# Patient Record
Sex: MALE | Race: WHITE
[De-identification: names, ages, dates, MRNs, and addresses within clinical notes are randomized per-mention and may not be internally consistent; named-entity substitution may affect disease eponyms.]

---

## 2018-08-19 ENCOUNTER — HOSPITAL ENCOUNTER (EMERGENCY)
Dept: HOSPITAL 72 - EMR | Age: 53
Discharge: HOME | End: 2018-08-19
Payer: MEDICAID

## 2018-08-19 VITALS — HEIGHT: 69 IN | WEIGHT: 220 LBS | BODY MASS INDEX: 32.58 KG/M2

## 2018-08-19 VITALS — SYSTOLIC BLOOD PRESSURE: 139 MMHG | DIASTOLIC BLOOD PRESSURE: 93 MMHG

## 2018-08-19 VITALS — DIASTOLIC BLOOD PRESSURE: 93 MMHG | SYSTOLIC BLOOD PRESSURE: 139 MMHG

## 2018-08-19 DIAGNOSIS — G61.0: ICD-10-CM

## 2018-08-19 DIAGNOSIS — F19.10: Primary | ICD-10-CM

## 2018-08-19 DIAGNOSIS — F20.9: ICD-10-CM

## 2018-08-19 DIAGNOSIS — Z88.8: ICD-10-CM

## 2018-08-19 LAB
ADD MANUAL DIFF: NO
ALBUMIN SERPL-MCNC: 4.1 G/DL (ref 3.4–5)
ALBUMIN/GLOB SERPL: 1.1 {RATIO} (ref 1–2.7)
ALP SERPL-CCNC: 82 U/L (ref 46–116)
ALT SERPL-CCNC: 59 U/L (ref 12–78)
ANION GAP SERPL CALC-SCNC: 15 MMOL/L (ref 5–15)
AST SERPL-CCNC: 59 U/L (ref 15–37)
BASOPHILS NFR BLD AUTO: 1.4 % (ref 0–2)
BILIRUB SERPL-MCNC: 0.8 MG/DL (ref 0.2–1)
BUN SERPL-MCNC: 30 MG/DL (ref 7–18)
CALCIUM SERPL-MCNC: 9.1 MG/DL (ref 8.5–10.1)
CHLORIDE SERPL-SCNC: 97 MMOL/L (ref 98–107)
CO2 SERPL-SCNC: 25 MMOL/L (ref 21–32)
CREAT SERPL-MCNC: 1.4 MG/DL (ref 0.55–1.3)
EOSINOPHIL NFR BLD AUTO: 0.4 % (ref 0–3)
ERYTHROCYTE [DISTWIDTH] IN BLOOD BY AUTOMATED COUNT: 11.4 % (ref 11.6–14.8)
GLOBULIN SER-MCNC: 3.8 G/DL
HCT VFR BLD CALC: 41.6 % (ref 42–52)
HGB BLD-MCNC: 14.4 G/DL (ref 14.2–18)
LYMPHOCYTES NFR BLD AUTO: 13.6 % (ref 20–45)
MCV RBC AUTO: 89 FL (ref 80–99)
MONOCYTES NFR BLD AUTO: 7.6 % (ref 1–10)
NEUTROPHILS NFR BLD AUTO: 77 % (ref 45–75)
PLATELET # BLD: 231 K/UL (ref 150–450)
POTASSIUM SERPL-SCNC: 3.2 MMOL/L (ref 3.5–5.1)
RBC # BLD AUTO: 4.68 M/UL (ref 4.7–6.1)
SODIUM SERPL-SCNC: 137 MMOL/L (ref 136–145)
WBC # BLD AUTO: 11.8 K/UL (ref 4.8–10.8)

## 2018-08-19 PROCEDURE — 80053 COMPREHEN METABOLIC PANEL: CPT

## 2018-08-19 PROCEDURE — 80329 ANALGESICS NON-OPIOID 1 OR 2: CPT

## 2018-08-19 PROCEDURE — 99284 EMERGENCY DEPT VISIT MOD MDM: CPT

## 2018-08-19 PROCEDURE — 85025 COMPLETE CBC W/AUTO DIFF WBC: CPT

## 2018-08-19 PROCEDURE — 36415 COLL VENOUS BLD VENIPUNCTURE: CPT

## 2018-08-19 PROCEDURE — 80307 DRUG TEST PRSMV CHEM ANLYZR: CPT

## 2018-08-19 NOTE — EMERGENCY ROOM REPORT
History of Present Illness


General


Chief Complaint:  Behavioral Complaint


Source:  Patient


 (Josue Ge MD)


Source:  Patient


 (MITZI MEYERS M.D.)


Present Illness


HPI


Patient is a 52-year-old male presented after increased hallucinations and 

suicidal thoughts.  Patient states that he had prior history of had been 

staying at a residential facility up until about 2 weeks ago.  Patient states 

that he had been having increased auditory hallucinations telling him to harm 

himself.  He reports recent methamphetamine use.  He states this is 

approximately 3 days ago.  He denies any shortness of breath.He denies any 

chest pain.  He reports having been off of his psychiatric medications for 

several days.


 (Josue Ge MD)


Allergies:  


Coded Allergies:  


     CEPHALEXIN (Verified  Allergy, Severe, Anaphylaxis, 12/14/15)


     PROCHLORPERAZINE (Verified  Allergy, Unknown, 11/25/15)


     RISPERIDONE (Verified  Allergy, Unknown, 11/25/15)





Patient History


Reviewed Nursing Documentation:  PMH: Agreed; PSxH: Agreed (Josue Ge MD)





Nursing Documentation-PMH


Hx Cardiac Problems:  No - ARTHRITIS, BACK SX, GUILLAIN-BARRE, SKIN GRAFT, Hep-C


Hx Cancer:  No


Hx Gastrointestinal Problems:  Yes


Hx Neurological Problems:  Yes


Hx Guillian-[place] Syndrome:  Yes


 (Josue Ge MD)





Review of Systems


All Other Systems:  negative except mentioned in HPI


 (Josue Ge MD)





Physical Exam





Vital Signs








  Date Time  Temp Pulse Resp B/P (MAP) Pulse Ox O2 Delivery O2 Flow Rate FiO2


 


8/19/18 20:04 98.1 117 18 139/93 95 Room Air  





 98.1       








Sp02 EP Interpretation:  reviewed, normal


General Appearance:  alert/responsive, no apparent distress, GCS 15, non-toxic


Head:  atraumatic


Eyes:  PERRL, lids + conjunctiva normal


ENT:  normal ENT inspection, TMs + canals normal, hearing intact, no angioedema


Neck:  supple/symm/no masses, no meningismus


Respiratory:  effort normal, no wheezing, chest symmetrical


Cardiovascular:  regular rate, rhythm, no edema


Cardiovascular #2:  2+ carotid (R), 2+ carotid (L), 2+ dorsalis pedis (R), 2+ 

dorsalis pedis (L)


Gastrointestinal:  non-tender, no mass, non-distended, no rebound/guarding, 

normal bowel sounds


Musculoskeletal:  gait & station normal, strength & tone normal, normal ROM, non

-tender


Neurologic:  normal inspection, CN II-XII intact, oriented x3, sensory intact, 

normal speech


Skin:  no rash, well hydrated


Lymphatic:  normal inspection


 (Josue Ge MD)





Medical Decision Making


Diagnostic Impression:  


 Primary Impression:  


 Polysubstance abuse


 Additional Impression:  


 Schizophrenia


 Qualified Codes:  F20.9 - Schizophrenia, unspecified


ER Course


 Patient presented for auditory hallucinations.  Differential diagnoses include 

substance abuse, psychosis, bipolar disorder, depression, malingering.  Because 

of complexity of patient's case laboratory testing and imaging studies were 

ordered.


Patient does not appear to be in acute distress.  Patient appears to be awake 

and alert.  Was having previously been stable with his medications.  He denies 

using his medications for the past 2 days.  Patient was given Zyprexa in 

emergency department.Urine drug screen was positive for cocaine as well as 

amphetamines.Patient is medically cleared.  The patient does not appear to be 

in any acute distress.





Labs








Test


  8/19/18


20:15 8/19/18


20:40


 


Urine Opiates Screen


  Negative


(NEGATIVE) 


 


 


Urine Barbiturates Screen


  Negative


(NEGATIVE) 


 


 


Phencyclidine (PCP) Screen


  Negative


(NEGATIVE) 


 


 


Urine Amphetamines Screen


  Positive


(NEGATIVE) 


 


 


Urine Benzodiazepines Screen


  Negative


(NEGATIVE) 


 


 


Urine Cocaine Screen


  Positive


(NEGATIVE) 


 


 


Urine Marijuana (THC) Screen


  Negative


(NEGATIVE) 


 


 


White Blood Count


  


  11.8 K/UL


(4.8-10.8)


 


Red Blood Count


  


  4.68 M/UL


(4.70-6.10)


 


Hemoglobin


  


  14.4 G/DL


(14.2-18.0)


 


Hematocrit


  


  41.6 %


(42.0-52.0)


 


Mean Corpuscular Volume  89 FL (80-99) 


 


Mean Corpuscular Hemoglobin


  


  30.8 PG


(27.0-31.0)


 


Mean Corpuscular Hemoglobin


Concent 


  34.7 G/DL


(32.0-36.0)


 


Red Cell Distribution Width


  


  11.4 %


(11.6-14.8)


 


Platelet Count


  


  231 K/UL


(150-450)


 


Mean Platelet Volume


  


  7.8 FL


(6.5-10.1)


 


Neutrophils (%) (Auto)


  


  77.0 %


(45.0-75.0)


 


Lymphocytes (%) (Auto)


  


  13.6 %


(20.0-45.0)


 


Monocytes (%) (Auto)


  


  7.6 %


(1.0-10.0)


 


Eosinophils (%) (Auto)


  


  0.4 %


(0.0-3.0)


 


Basophils (%) (Auto)


  


  1.4 %


(0.0-2.0)


 


Sodium Level


  


  137 MMOL/L


(136-145)


 


Potassium Level


  


  3.2 MMOL/L


(3.5-5.1)


 


Chloride Level


  


  97 MMOL/L


()


 


Carbon Dioxide Level


  


  25 MMOL/L


(21-32)


 


Anion Gap


  


  15 mmol/L


(5-15)


 


Blood Urea Nitrogen


  


  30 mg/dL


(7-18)


 


Creatinine


  


  1.4 MG/DL


(0.55-1.30)


 


Estimat Glomerular Filtration


Rate 


  53.6 mL/min


(>60)


 


Glucose Level


  


  119 MG/DL


()


 


Calcium Level


  


  9.1 MG/DL


(8.5-10.1)


 


Total Bilirubin


  


  0.8 MG/DL


(0.2-1.0)


 


Aspartate Amino Transf


(AST/SGOT) 


  59 U/L (15-37) 


 


 


Alanine Aminotransferase


(ALT/SGPT) 


  59 U/L (12-78) 


 


 


Alkaline Phosphatase


  


  82 U/L


()


 


Total Protein


  


  7.9 G/DL


(6.4-8.2)


 


Albumin


  


  4.1 G/DL


(3.4-5.0)


 


Globulin  3.8 g/dL 


 


Albumin/Globulin Ratio  1.1 (1.0-2.7) 


 


Salicylates Level


  


  1.9 ug/mL


(2.8-20)


 


Acetaminophen Level


  


  < 2 MCG/ML


(10-30)


 


Serum Alcohol  54 mg/dL 








 (Josue Ge MD)


ER Course


Patient signout to me.  He came in with auditory hallucination and vague 

suicidal thoughts.  Also positive for methamphetamine and cocaine.  Initially 

he wanted to be sent to a psych facility voluntarily.  After been here for a 

few hours, he said he wanted to go out to smoke.  No longer suicidal.  Said 

that he felt better.  Said that he no longer want to go to a psych facility.  

He went outside to smoke and never came back.





This patient is a chronic risk of self injury due to poor impulse control, 

limited coping skills, and judgment intermittently impaired by intoxication.  I 

believe that the available clinical evidence to suggest that these 

characteristics derived primarily from personality disorder and are likely very 

stable over time.  Hospitalization would likely attenuate risk of self-harm 

only during snf period, without lasting risk reduction.  Serious self-harm

, while possible, would likely be inadvertent, and because of impulsivity, and 

foreseeable.  For these reasons, I do not believe hospitalization would provide 

meaningful reduction in risk of self-harm.


 (MITZI MEYERS M.D.)





Last Vital Signs








  Date Time  Temp Pulse Resp B/P (MAP) Pulse Ox O2 Delivery O2 Flow Rate FiO2


 


8/19/18 20:04 98.1 117 18 139/93 95 Room Air  





 98.1       








Status:  improved


 (Josue Ge MD)


Disposition:  HOME, SELF-CARE


Condition:  Stable











Josue Ge MD Aug 19, 2018 20:36


MITZI MEYERS M.D. Aug 19, 2018 23:28

## 2018-12-13 NOTE — EMERGENCY ROOM REPORT
Daily Note     Today's date: 2018  Patient name: Ed Le  : 1968  MRN: [id number]  Referring provider: Alvaro Cochran:   Encounter Diagnosis     ICD-10-CM    1  Chondromalacia of both patellae M22 41     M22 42    2  Acute pain of both knees M25 561     M25 562    3  Sprain of medial collateral ligament of left knee, subsequent encounter [id number]                   Subjective: Pt feels 3/10 pain in right knee and 0/10 in left knee today  States that he feels almost no pain or stiffness in the left, however, the right knee needs work  He has been very busy with work recently  Objective: See treatment diary below    Precautions standard    Specialty Daily Treatment Diary     Manual        PROM knee Bilateral flex and ext Bilateral flex and ext      STM Medial bilateral knee Medial Bilateral knee      PFJ mobs Inf and med gr II-III Inf and med gr II-III on right      Hamstring stretch 30 sec, 1x3 ea 30 sec, 1x3 on right                  Exercise Diary         Rec bike NV 5 min, lvl 1      gs stretch with strap        Standing hip abduction        Step ups        Quad sets Hold 5, 2x10 ea Hold 5, 2x10 ea      SAQ Hold 5, 3x5 ea Hold 5, 3x5 ea      Heel slides Hold 5, 1x15 Hold 5, 1x15      bridges Off bolster hold 5, 3x5 Off bolster hold 5, 3x5      Ball Squeezes  1x10                                                          Modalities        CP                              Assessment: Pt tolerated treatment well with minimal to no complaints of pain  Patient needed cues for technique when completing his bridges  He continues to present with weak quad activation on the R but improvement is noticed on the L  Plan: Continue with plan of care to decrease pain, improve mobility, strength, and function  Progress treatment as tolerated  Patient co-treated by KEYUR Shah under my supervision  History of Present Illness


General


Chief Complaint:  Behavioral Complaint


Source:  Patient





Present Illness


HPI


Is a 51-year-old male who has history of schizophrenia.  He has a history of 

drug abuse with amphetamine.  He presents with chief complaint of racing 

thoughts and hearing voices.  He said he is out of his medication since 

yesterday.  Also not to using drugs.  Denies any fever chills denies any nausea 

vomiting.  No homicidal thought.  He is chronically suicidal. has no specific 

plans.Has been in multiple psychiatric facility.  Denies any other complaint.


Allergies:  


Coded Allergies:  


     CEPHALEXIN (Verified  Allergy, Severe, Anaphylaxis, 12/14/15)


     PROCHLORPERAZINE (Verified  Allergy, Unknown, 11/25/15)


     RISPERIDONE (Verified  Allergy, Unknown, 11/25/15)





Patient History


Past Medical History:  see triage record, old chart reviewed, psych hx


Past Surgical History:  other


Pertinent Family History:  none


Social History:  Reports: drug use, smoking


Immunizations:  other


Reviewed Nursing Documentation:  PMH: Agreed, PSxH: Agreed





Nursing Documentation-PMH


Hx Cardiac Problems:  No - ARTHRITIS, BACK SX, GUILLAIN-BARRE, SKIN GRAFT, Hep-C


Hx Cancer:  No


Hx Gastrointestinal Problems:  Yes


History Of Psychiatric Problem:  Yes - Schizoaffective, major depressive 

disorder


Hx Neurological Problems:  Yes


Hx Guillian-[place] Syndrome:  Yes





Review of Systems


Eye:  Denies: blurred vision, eye pain


ENT:  Denies: ear pain, nose congestion, throat swelling


Respiratory:  Denies: cough, shortness of breath


Cardiovascular:  Denies: chest pain, palpitations


Gastrointestinal:  Denies: abdominal pain, diarrhea, nausea, vomiting


Musculoskeletal:  Denies: back pain, joint pain


Skin:  Denies: rash


Neurological:  Denies: headache, numbness


Endocrine:  Denies: increased thirst, increased urine


Hematologic/Lymphatic:  Denies: easy bruising


All Other Systems:  negative except mentioned in HPI





Physical Exam





Vital Signs








  Date Time  Temp Pulse Resp B/P Pulse Ox O2 Delivery O2 Flow Rate FiO2


 


6/18/17 03:53 97.5 114 16 149/95 98 Room Air  





vitals unremarkable


Sp02 EP Interpretation:  reviewed, normal


General Appearance:  well appearing, no apparent distress, alert


Head:  normocephalic, atraumatic


Eyes:  bilateral eye EOMI, bilateral eye PERRL


ENT:  hearing grossly normal, normal pharynx


Neck:  full range of motion, supple, no meningismus


Respiratory:  chest non-tender, lungs clear, normal breath sounds


Cardiovascular #1:  regular rate, rhythm, no murmur


Gastrointestinal:  normal bowel sounds, non tender, no mass, no organomegaly, 

no bruit, non-distended


Musculoskeletal:  back normal, gait/station normal, normal range of motion


Psychiatric:  mood/affect normal


Skin:  warm/dry





Medical Decision Making


Diagnostic Impression:  


 Primary Impression:  


 Psychosis


 Qualified Codes:  F29 - Unspecified psychosis not due to a substance or known 

physiological condition


 Additional Impressions:  


 Methamphetamine abuse


 Medication refill


ER Course


This patient is a chronic risk of self injury due to poor impulse control, 

limited coping skills, and judgment intermittently impaired by intoxication.  I 

believe that the available clinical evidence to suggest that these 

characteristics derived primarily from personality disorder and are likely very 

stable over time.  Hospitalization would likely attenuate risk of self-harm 

only during FPC period, without lasting risk reduction.  Serious self-harm

, while possible, would likely be inadvertent, and because of impulsivity, and 

foreseeable.  For these reasons, I do not believe hospitalization would provide 

meaningful reduction in risk of self-harm.





Last Vital Signs








  Date Time  Temp Pulse Resp B/P Pulse Ox O2 Delivery O2 Flow Rate FiO2


 


6/18/17 03:53 97.5 114 16 149/95 98 Room Air  








Status:  improved


Disposition:  HOME, SELF-CARE


Condition:  Stable


Scripts


Olanzapine (ZYPREXA) 15 Mg Tablet


15 MG ORAL BID, #60 TAB 0 Refills


   Prov: MITZI MEYERS M.D.         6/18/17 


Mirtazapine* (REMERON*) 30 Mg Tablet


30 MG ORAL BEDTIME, #30 TAB


   Prov: MITZI MEYERS M.D.         6/18/17


Patient Instructions:  Self-Destructive Behavior





Additional Instructions:  


Followup with your mental health clinics in 7 days.  Abstain from drugs and 

alcohol.  Return if symptom worsen.











MITZI MEYERS M.D. Jun 18, 2017 05:12

## 2020-03-26 ENCOUNTER — EMERGENCY (EMERGENCY)
Facility: HOSPITAL | Age: 55
LOS: 1 days | Discharge: HOME | End: 2020-03-26
Attending: EMERGENCY MEDICINE | Admitting: EMERGENCY MEDICINE
Payer: COMMERCIAL

## 2020-03-26 VITALS
DIASTOLIC BLOOD PRESSURE: 70 MMHG | HEART RATE: 106 BPM | TEMPERATURE: 99 F | WEIGHT: 179.9 LBS | SYSTOLIC BLOOD PRESSURE: 107 MMHG | RESPIRATION RATE: 16 BRPM | OXYGEN SATURATION: 95 %

## 2020-03-26 DIAGNOSIS — R05 COUGH: ICD-10-CM

## 2020-03-26 DIAGNOSIS — B34.2 CORONAVIRUS INFECTION, UNSPECIFIED: ICD-10-CM

## 2020-03-26 LAB — SARS-COV-2 RNA SPEC QL NAA+PROBE: DETECTED

## 2020-03-26 PROCEDURE — 71046 X-RAY EXAM CHEST 2 VIEWS: CPT | Mod: 26

## 2020-03-26 PROCEDURE — 99284 EMERGENCY DEPT VISIT MOD MDM: CPT

## 2020-03-26 RX ORDER — ACETAMINOPHEN 500 MG
975 TABLET ORAL ONCE
Refills: 0 | Status: COMPLETED | OUTPATIENT
Start: 2020-03-26 | End: 2020-03-26

## 2020-03-26 RX ADMIN — Medication 975 MILLIGRAM(S): at 13:16

## 2020-03-26 NOTE — ED ADULT NURSE REASSESSMENT NOTE - COMFORT CARE
tolerated PO. Will monitor and f/u as indicated./meal provided/plan of care explained/po fluids offered/warm blanket provided/assisted to bathroom

## 2020-03-26 NOTE — ED PROVIDER NOTE - OBJECTIVE STATEMENT
fever, cough with green phlegm, nasal congestion, muscle aches/bodyaches. +diarrhea 2 days none since. no n/v, +normal appetite. +sob when coughing only, no chest pain. x 4 days, traveled by bus from [place]. +ex-meth. cocaine yesterday, beers last night. +cigarettes.     remeron 45mg qhs, zyprexa 20mg po, wellburtin 300mg xl po, gabapentin 800mg tid, hydrocodone 5/325mg prn, schizoaffective d/o / herniated disc  all compazine, risperdal (EPS), keflex (sob)    guillain barre syndrome, hemorrhoidectomy, ptx, facial reconstruction, hep C (recovered)    [facility], mobile clinic .. 54yoM smoker here with 4 days of subjective fever, cough with green phlegm, nasal congestion, muscle aches/bodyaches. +diarrhea 2 days ago, none since. no n/v, +normal appetite. +sob when coughing only, no chest pain. He traveled by bus from [place] 5 days ago "to get away from all the meth, which he used to do" + cocaine yesterday, +some beers last night. Not a daily drinker. Went to [facility] to get some food, they had a medical van and he went in to get evaluated and they sent him to the ER. Also asking for food.     remeron 45mg qhs, zyprexa 20mg po, wellburtin 300mg xl po, gabapentin 800mg tid, hydrocodone 5/325mg prn, schizoaffective d/o / herniated disc  allergies: compazine, risperdal (EPS), keflex (sob)    guillain barre syndrome, hemorrhoidectomy, ptx, facial reconstruction, hep C (recovered)    [facility], mobile clinic .. 54yoM smoker here with 4 days of subjective fever, cough with green phlegm, nasal congestion, muscle aches/bodyaches. +diarrhea 2 days ago, none since. no n/v, +normal appetite. +sob when coughing only, no chest pain. He traveled by bus from [place] 5 days ago "to get away from all the meth, which I used to do" + cocaine yesterday, +some beers last night. Not a daily drinker. Went to [facility] to get some food, they had a medical van and he went in to get evaluated and they sent him to the ER. Also asking for food.     remeron 45mg qhs, zyprexa 20mg po, wellburtin 300mg xl po, gabapentin 800mg tid, hydrocodone 5/325mg prn, schizoaffective d/o / herniated disc  allergies: compazine, risperdal (EPS), keflex (sob)    guillain barre syndrome, hemorrhoidectomy, ptx, facial reconstruction, hep C (recovered)    [facility], mobile clinic ..

## 2020-03-26 NOTE — ED PROVIDER NOTE - PATIENT PORTAL LINK FT
You can access the FollowMyHealth Patient Portal offered by [facility] by registering at the following website: [email or web address]. By joining [facility]’s FollowMyHealth portal, you will also be able to view your health information using other applications (apps) compatible with our system.

## 2020-03-26 NOTE — ED PROVIDER NOTE - PHYSICAL EXAMINATION
GEN: Well appearing, well nourished, awake, alert, oriented to person, place, time/situation and in no apparent distress.  HENMT: Airway patent, neck supple. No stridor. Moist mucous membranes.  EYES: No pallor or scleral icterus.  CARDIAC: Normal rate, regular rhythm.  Heart sounds S1, S2.  No murmurs, rubs or gallops.   RESP: Breath sounds clear and equal bilaterally. Normal work of breathing. No respiratory distress.   GI: Abdomen soft, non-tender, no rebound or guarding. Bowel sounds present. No CVAT.   BACK: No midline spinal ttp  MSK: FROM, no obvious deformities, no pedal edema  NEURO: Alert and oriented x3, MAEx4 purposefully, follows commands appropriately, normal gait   PSYCH: Calm and cooperative

## 2020-03-26 NOTE — ED ADULT NURSE NOTE - OBJECTIVE STATEMENT
dakotah from [facility], also a part of  project renewal. Pt sent for cough/ fever/ body aches after taking bus from [place] to [facility]. Pt denies SOB, no CP. Center is requesting r/o covid. All orders carried out, pt provided with food, tolerating PO.

## 2020-03-26 NOTE — ED PROVIDER NOTE - CLINICAL SUMMARY MEDICAL DECISION MAKING FREE TEXT BOX
54yoM smoker here with cough/congestion/malaise/subj f/v x 4 days. Vitals with mild tachycardia, exam with well appearing pt, no resp distress, clear lungs. Will swab for covid19, get cxr, give acetaminophen for bodyaches and oral hydration. Sx likely viral syndrome.

## 2020-03-26 NOTE — ED PROVIDER NOTE - PMH
Back pain    Hepatitis C virus infection without hepatic coma, unspecified chronicity  now in remission  Schizoaffective disorder, unspecified type

## 2020-03-26 NOTE — ED PROVIDER NOTE - NS ED ROS FT
GEN: + f/c, +malaise  HEENT: + nasal congestion+ sore throat   CV: no chest pain, no palpitations  RESP: + cough, + SOB  GI: no abd pain, no nausea, no vomiting, no diarrhea  : no dysuria or frequency  MSK: no back pain, +bodyaches  NEURO: no headache

## 2020-03-26 NOTE — ED ADULT NURSE NOTE - NSIMPLEMENTINTERV_GEN_ALL_ED
Implemented All Universal Safety Interventions:  [place] to call system. Call bell, personal items and telephone within reach. Instruct patient to call for assistance. Room bathroom lighting operational. Non-slip footwear when patient is off stretcher. Physically safe environment: no spills, clutter or unnecessary equipment. Stretcher in lowest position, wheels locked, appropriate side rails in place.

## 2020-03-26 NOTE — ED PROVIDER NOTE - PROGRESS NOTE DETAILS
spoke with [facility], will find placement for pt to self-isolate call [facility] again, will call me back with update on placement spoke with [facility] again, still waiting on an update from their end, will call me back

## 2020-03-26 NOTE — ED PROVIDER NOTE - NSFOLLOWUPINSTRUCTIONS_ED_ALL_ED_FT
- you have CORONAVIRUS  - covid19 - you need to isolate yourself for 14 days - that means you are not around anyone and be wearing a mask the whole time.    - please follow the guidelines provided about how to deal with the coronavirus infection    - return to the ER if you feel worse in any way

## 2020-03-27 VITALS
RESPIRATION RATE: 16 BRPM | OXYGEN SATURATION: 97 % | HEART RATE: 83 BPM | SYSTOLIC BLOOD PRESSURE: 122 MMHG | DIASTOLIC BLOOD PRESSURE: 85 MMHG | TEMPERATURE: 98 F

## 2020-03-27 NOTE — ED BEHAVIORAL HEALTH NOTE - BEHAVIORAL HEALTH NOTE
Sw was just recently made aware of the patient in the ED. Per the notes the [facility] BHUPINDER hotline [phone] was called by the provider at 2:20pm on 3/26/20 regarding the status of the patient. [facility] medical office called the ED multiple times stating that the patient could not return to the shelter per his positive COVID-19 results and provider called multiple times per status on the placement. Per the conversation at 11:10am this worker has with Cesar ([employer] Employee) and Bridgette (Director), the patient had a bed last night but it was lost do to the fact that the patient never showed up and is now back in the rotation for a bed and under the pending status. This worker inquired with both Cesar and Bridgette asking how the patient lost his bed if this facility was never notified that he was given a bed and transport was not arranged by them to  the patient or the facility was never given the address to the shelter so that we could arrange transport. This worker was given the run around and was not given a straight answer. The answer that was given is that the patient is back in pending and that we will be notified when a placement is available. Correct contact information was confirmed with Cesar. Currently awaiting placement for the patient. Team made aware. Worker made available for any further assistance needed. Sw was just recently made aware of the patient in the ED. Per the notes the [facility] BHUPINDER hotline [phone] was called by the provider at 2:20pm on 3/26/20 regarding the status of the patient. [facility] medical office called the ED multiple times stating that the patient could not return to the shelter per his positive COVID-19 results and provider called the BHUPINDER hotline multiple times per status on the placement. Per the conversation at 11:10am this worker has with Cesar ([facility] Employee) and Bridgette (Director), the patient had a bed last night but it was lost do to the fact that the patient never showed up and is now back in the rotation for a bed and under the pending status. This worker inquired with both Cesar and Bridgette asking how the patient lost his bed if this facility was never notified that he was given a bed and transport was not arranged by them to  the patient or the facility was never given the address to the shelter so that we could arrange transport. This worker was given the run around and was not given a straight answer. The answer that was given is that the patient is back in pending and that we will be notified when a placement is available. Correct contact information was confirmed with Cesar. Currently awaiting placement for the patient. Team made aware. Worker made available for any further assistance needed. Sw was just recently made aware of the patient in the ED. Per the notes the [facility]UPINDER hotline [phone] was called by the provider at 2:20pm on 3/26/20 regarding the status of the patient. [facility] medical office called the ED multiple times stating that the patient could not return to the shelter per his positive COVID-19 results and provider called the [facility] hotline multiple times per status on the placement. Per the conversation at 11:10am this worker has with Cesar ([employer] Employee) and Bridgette (Director), the patient had a bed last night but it was lost do to the fact that the patient never showed up and is now back in the rotation for a bed and under the pending status. This worker inquired with both Cesar and Bridgette asking how the patient lost his bed if this facility was never notified that he was given a bed and transport was not arranged by them to  the patient or the facility was never given the address to the shelter so that we could arrange transport. This worker was given the run around and was not given a straight answer. The answer that was given is that the patient is back in pending and that we will be notified when a placement is available. Correct contact information was confirmed with Cesar. Currently awaiting placement for the patient. Team made aware. Worker made available for any further assistance needed.      Update 4:30pm: This writer called the [facility] hotline and was on hold for over an hour before she was able to reach someone. The patient was finally assigned a bed and a cab was called for by [facility] for the patient. The patient was picked up at 4:45 and taken to the [facility] in [place] for isolation. Worker made avilable for any further assistance needed.

## 2020-03-27 NOTE — ED ADULT NURSE REASSESSMENT NOTE - NS ED NURSE REASSESS COMMENT FT1
Pt resting comfortably in chair. Pt given coffee as requested. Pt denies CP,SOB,n/v/d at this time. Pt c.o body aches at this time refusing medication. Pt A&Ox3 OOB self with steady gait. Will continue to monitor.
Pt sitting in bed, just returned from bathroom where he states he had diarrhea. Pt given new pair of pants. Pt cleared for D/C but Dr. Reza looking for placement in a [place] [address] homeless shelter. RR 18, unlabored. Pt given food. Will continue to monitor until D/C. Side rails up, safety is maintained.

## 2020-07-23 ENCOUNTER — HOSPITAL ENCOUNTER (INPATIENT)
Dept: HOSPITAL 74 - YASAS | Age: 55
LOS: 3 days | Discharge: HOME | DRG: 773 | End: 2020-07-26
Attending: ALLERGY & IMMUNOLOGY | Admitting: ALLERGY & IMMUNOLOGY
Payer: COMMERCIAL

## 2020-07-23 VITALS — BODY MASS INDEX: 23.9 KG/M2

## 2020-07-23 DIAGNOSIS — K44.9: ICD-10-CM

## 2020-07-23 DIAGNOSIS — F17.210: ICD-10-CM

## 2020-07-23 DIAGNOSIS — Z94.5: ICD-10-CM

## 2020-07-23 DIAGNOSIS — E72.20: ICD-10-CM

## 2020-07-23 DIAGNOSIS — F16.10: ICD-10-CM

## 2020-07-23 DIAGNOSIS — F12.10: ICD-10-CM

## 2020-07-23 DIAGNOSIS — F11.10: ICD-10-CM

## 2020-07-23 DIAGNOSIS — F25.9: ICD-10-CM

## 2020-07-23 DIAGNOSIS — Z98.890: ICD-10-CM

## 2020-07-23 DIAGNOSIS — Z88.8: ICD-10-CM

## 2020-07-23 DIAGNOSIS — F15.10: ICD-10-CM

## 2020-07-23 DIAGNOSIS — Z88.1: ICD-10-CM

## 2020-07-23 DIAGNOSIS — F10.230: Primary | ICD-10-CM

## 2020-07-23 DIAGNOSIS — F14.10: ICD-10-CM

## 2020-07-23 DIAGNOSIS — Z86.19: ICD-10-CM

## 2020-07-23 DIAGNOSIS — Z90.49: ICD-10-CM

## 2020-07-23 PROCEDURE — HZ2ZZZZ DETOXIFICATION SERVICES FOR SUBSTANCE ABUSE TREATMENT: ICD-10-PCS | Performed by: ALLERGY & IMMUNOLOGY

## 2020-07-23 PROCEDURE — U0003 INFECTIOUS AGENT DETECTION BY NUCLEIC ACID (DNA OR RNA); SEVERE ACUTE RESPIRATORY SYNDROME CORONAVIRUS 2 (SARS-COV-2) (CORONAVIRUS DISEASE [COVID-19]), AMPLIFIED PROBE TECHNIQUE, MAKING USE OF HIGH THROUGHPUT TECHNOLOGIES AS DESCRIBED BY CMS-2020-01-R: HCPCS

## 2020-07-23 RX ADMIN — HYDROXYZINE PAMOATE SCH: 25 CAPSULE ORAL at 23:09

## 2020-07-23 RX ADMIN — NICOTINE SCH: 7 PATCH TRANSDERMAL at 18:23

## 2020-07-23 RX ADMIN — Medication SCH: at 23:09

## 2020-07-23 RX ADMIN — Medication SCH TAB: at 18:18

## 2020-07-23 RX ADMIN — IBUPROFEN PRN MG: 400 TABLET, FILM COATED ORAL at 18:13

## 2020-07-23 RX ADMIN — HYDROXYZINE PAMOATE SCH MG: 25 CAPSULE ORAL at 18:15

## 2020-07-23 NOTE — BHS.RME
Substance Use & Tx History





- Substance Use History


  ** Alcohol


Substance amount: 4 x 24 ounce beer


Frequency of use: Daily


Substance route: Oral


Date of Last Use: 20





  ** Heroin


Substance amount: $10


Frequency of use: Less than 3 times per week


Substance route: Inhalation (ex: sniffing or snorting)


Date of Last Use: 20





  ** Cocaine-Crack


Substance amount: $85


Frequency of use: Daily


Substance route: Smoking


Date of Last Use: 20





  ** PCP


Substance amount: one vial found on ground


Frequency of use: Once a month


Substance route: Smoking


Date of Last Use: 20





  ** Other Opiates/Synthetics


Substance amount: Tylenol with Codeine #3


Frequency of use: Less than 3 times per week


Substance route: Oral


Date of Last Use: 20





  ** OxyContin


Substance amount: one tab


Frequency of use: Less than 3 times per week


Substance route: Oral


Date of Last Use: 20





  ** Nicotine


Substance amount: one pack


Frequency of use: Daily


Substance route: Smoking


Date of Last Use: 20





- Last Treatment


Date of last treatment: one month ago [facility]


Treatment type: Substance Use Disorder (PONCHO)


Where was last treatment: Detox





Physical/Psych/Mental Status





- Behavior


General Behavior: Increased activity (restlessness, agitation)


Eye Contact: Normal





- Cooperativeness


Cooperativeness: Cooperative





- Thinking


Thought Processes: Tight


Thought content: Future oriented





- Physical Health Problems


Is patient presently having any pain?: Yes (chronic low back pain, foot pain for

3 days, left MCP joint pain told gangl)


Does patient presently have any injuries (include location): No


Does patient currently have a fever: No





COWS





- Scale


Resting Pulse: 4= UT > 121


Sweating: 3= Beads of Sweat on Face


Restless Observation: 3= Extraneous Movement


Pupil Size: 1= Pupils >than Normal


Bone or Joint Aches: 1= Mild Discomfort


Runny Nose/ Eye Tearin= Nasal Congestion


GI Upset > 30mins: 1= Stomach Cramp


Tremor Observation: 4= Gross Tremor/Twitching


Yawning Observation: 0= None


Anxiety or Irritability: 1=Feels Anxious/Irritable


Goose Flesh Skin: 0=Smooth Skin


COWS Score: 19





CIWA


Nausea/Vomiting: 3


Muscle Tremors: 4-Moderate,w/Arms Extend


Anxiety: 3


Agitation: 1-Slight > Activity


Paroxysmal Sweats: 4-Forehead w/Sweat Beads


Orientation: 0-Oriented


Tacttile Disturbances: 0-None


Auditory Disturbances: 0-None


Visual Disturbances: 0-None


Headache: 1-Very Mild


CIWA-Ar Total Score: 16
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
## 2020-07-24 LAB
ALBUMIN SERPL-MCNC: 4.2 G/DL (ref 3.4–5)
ALP SERPL-CCNC: 65 U/L (ref 45–117)
ALT SERPL-CCNC: 32 U/L (ref 13–61)
ANION GAP SERPL CALC-SCNC: 9 MMOL/L (ref 8–16)
AST SERPL-CCNC: 27 U/L (ref 15–37)
BILIRUB CONJ SERPL-MCNC: 0.2 MG/DL (ref 0–0.2)
BILIRUB SERPL-MCNC: 0.9 MG/DL (ref 0.2–1)
BUN SERPL-MCNC: 11.4 MG/DL (ref 7–18)
CALCIUM SERPL-MCNC: 9.2 MG/DL (ref 8.5–10.1)
CHLORIDE SERPL-SCNC: 106 MMOL/L (ref 98–107)
CO2 SERPL-SCNC: 25 MMOL/L (ref 21–32)
CREAT SERPL-MCNC: 1 MG/DL (ref 0.55–1.3)
DEPRECATED RDW RBC AUTO: 14 % (ref 11.9–15.9)
GLUCOSE SERPL-MCNC: 85 MG/DL (ref 74–106)
HCT VFR BLD CALC: 41.6 % (ref 35.4–49)
HGB BLD-MCNC: 13.8 GM/DL (ref 11.7–16.9)
MCH RBC QN AUTO: 31.2 PG (ref 25.7–33.7)
MCHC RBC AUTO-ENTMCNC: 33.3 G/DL (ref 32–35.9)
MCV RBC: 93.7 FL (ref 80–96)
PLATELET # BLD AUTO: 227 K/MM3 (ref 134–434)
PMV BLD: 10.2 FL (ref 7.5–11.1)
POTASSIUM SERPLBLD-SCNC: 3.8 MMOL/L (ref 3.5–5.1)
PROT SERPL-MCNC: 7.6 G/DL (ref 6.4–8.2)
RBC # BLD AUTO: 4.44 M/MM3 (ref 4–5.6)
SODIUM SERPL-SCNC: 141 MMOL/L (ref 136–145)
WBC # BLD AUTO: 7.3 K/MM3 (ref 4–10)

## 2020-07-24 RX ADMIN — IBUPROFEN PRN MG: 400 TABLET, FILM COATED ORAL at 18:29

## 2020-07-24 RX ADMIN — Medication SCH TAB: at 10:57

## 2020-07-24 RX ADMIN — HYDROXYZINE PAMOATE SCH MG: 25 CAPSULE ORAL at 18:26

## 2020-07-24 RX ADMIN — LACTULOSE SCH GM: 20 SOLUTION ORAL at 15:00

## 2020-07-24 RX ADMIN — LACTULOSE SCH GM: 20 SOLUTION ORAL at 22:09

## 2020-07-24 RX ADMIN — HYDROXYZINE PAMOATE SCH MG: 25 CAPSULE ORAL at 10:57

## 2020-07-24 RX ADMIN — HYDROXYZINE PAMOATE SCH MG: 25 CAPSULE ORAL at 06:40

## 2020-07-24 RX ADMIN — IBUPROFEN PRN MG: 400 TABLET, FILM COATED ORAL at 06:40

## 2020-07-24 RX ADMIN — Medication SCH MG: at 22:09

## 2020-07-24 RX ADMIN — LACTULOSE SCH GM: 20 SOLUTION ORAL at 18:27

## 2020-07-24 RX ADMIN — HYDROXYZINE PAMOATE SCH: 25 CAPSULE ORAL at 15:27

## 2020-07-24 RX ADMIN — HYDROXYZINE PAMOATE SCH MG: 25 CAPSULE ORAL at 22:09

## 2020-07-24 RX ADMIN — NICOTINE SCH MG: 7 PATCH TRANSDERMAL at 10:57

## 2020-07-24 NOTE — PN
BHS Progress Note


Note: 





Patient is very confused. He is going into other patient's room, climbing up the

window sill and he ambulates with unsteady gait


                                        


                                        


                                         


                                   Vital Signs











Temperature  97.3 F L  07/24/20 05:03


 


Pulse Rate  81   07/24/20 05:03


 


Respiratory Rate  18   07/24/20 05:03


 


Blood Pressure  128/84   07/24/20 05:03


 


O2 Sat by Pulse Oximetry (%)  97   07/24/20 05:03








Action: Ammonia level ordered


         1:1 constant observation for safety. Floor provider to reevaluate 

patient.


         F/U with psych. evaluation

## 2020-07-24 NOTE — PN
[facility] CIWA





- CIWA Score


Nausea/Vomitin-Mild Nausea/No Vomiting


Muscle Tremors: 2


Anxiety: 2


Agitation: 2


Paroxysmal Sweats: No Perspiration


Orientation: 0-Oriented


Tacttile Disturbances: 1-Very Mild Itch/Numbness


Auditory Disturbances: 0-None


Visual Disturbances: 0-None


Headache: 1-Very Mild


CIWA-Ar Total Score: 9





BHS Progress Note (SOAP)


Subjective: 





alert,oriented x 3,had episode of confusion last,night stated he is homeless,on 

one and one





Objective: 





20 13:50


                                   Vital Signs











Temperature  97.3 F L  20 12:35


 


Pulse Rate  100 H  20 12:35


 


Respiratory Rate  18   20 12:35


 


Blood Pressure  127/72   20 12:35


 


O2 Sat by Pulse Oximetry (%)  95   20 12:35











20 13:51


                             Laboratory Last Values











WBC  7.3 K/mm3 (4.0-10.0)   20  08:15    


 


RBC  4.44 M/mm3 (4.00-5.60)   20  08:15    


 


Hgb  13.8 GM/dL (11.7-16.9)   20  08:15    


 


Hct  41.6 % (35.4-49)   20  08:15    


 


MCV  93.7 fl (80-96)   20  08:15    


 


MCH  31.2 pg (25.7-33.7)   20  08:15    


 


MCHC  33.3 g/dl (32.0-35.9)   20  08:15    


 


RDW  14.0 % (11.9-15.9)   20  08:15    


 


Plt Count  227 K/MM3 (134-434)   20  08:15    


 


MPV  10.2 fl (7.5-11.1)   20  08:15    


 


Sodium  141 mmol/L (136-145)   20  08:15    


 


Potassium  3.8 mmol/L (3.5-5.1)   20  08:15    


 


Chloride  106 mmol/L ()   20  08:15    


 


Carbon Dioxide  25 mmol/L (21-32)   20  08:15    


 


Anion Gap  9 MMOL/L (8-16)   20  08:15    


 


BUN  11.4 mg/dL (7-18)   20  08:15    


 


Creatinine  1.0 mg/dL (0.55-1.3)   20  08:15    


 


Est GFR (CKD-EPI)AfAm  99.85   20  08:15    


 


Est GFR (CKD-EPI)NonAf  86.15   20  08:15    


 


Random Glucose  85 mg/dL ()   20  08:15    


 


Hemoglobin A1c %  5.6 % (4.2-6.3)   20  08:15    


 


Calcium  9.2 mg/dL (8.5-10.1)   20  08:15    


 


Total Bilirubin  0.9 mg/dL (0.2-1)   20  08:15    


 


Direct Bilirubin  0.2 mg/dL (0.0-0.2)   20  08:15    


 


AST  27 U/L (15-37)   20  08:15    


 


ALT  32 U/L (13-61)   20  08:15    


 


Alkaline Phosphatase  65 U/L ()   20  08:15    


 


Ammonia  57.70 umol/L (11-32)  H  20  08:15    


 


Total Protein  7.6 g/dl (6.4-8.2)   20  08:15    


 


Albumin  4.2 g/dl (3.4-5.0)   20  08:15    


 


Syphilis Serology  Non-reactive  (NONREACTIVE)   20  08:15    











Assessment: 





20 13:51


withdrawal symptom


Plan: 





continue detox ativan regimen,lactulose 20 grams po qid,continue one on one for 

patient's safety,psychiatric evaluation by dr Medina,repeat ammonia level in am

## 2020-07-24 NOTE — PN
BHS Progress Note


Note: 





Patient place on 1:1 yesterday for confuision.


patient is alert all day today but w/ some episodes of agitation.








Plan:





Will d/c 1:1  


Observation hourly. Notify Provider if patient becomes confused or begins to 

wander or have behavioral changes that would affect safety.

## 2020-07-24 NOTE — PN
BHS Progress Note


Note: 





Psychiatry


Attending's note :


Third bedside visit.


For psychiatric interview.


Patient is found asleep.


Resting comfortably. No distress. 


On 1:1 constant observation. 


Nursing attendant at bedside.


Examination deferred earlier.  


Patient was going to bathroom.


Independently. No complaint offered.


At the time. Consult will be done in AM.


Maintain constant observation for safety.

## 2020-07-24 NOTE — PN
Teaching Attending Note


Name of Resident: Terrance Levine





ATTENDING PHYSICIAN STATEMENT





I saw and evaluated the patient.


I reviewed the resident's note and discussed the case with the resident.


I agree with the resident's findings and plan as documented.








SUBJECTIVE:


Agree with resident's subjective findings





OBJECTIVE:


Agree with resident's objective findings.





ASSESSMENT AND PLAN:


Agree with detox plans

## 2020-07-24 NOTE — PN
BHS Progress Note


Note: 





Psychiatry


Attending's note :


Bedside visit.


Patient found asleep.


Currently under constant observation.


For medical issues : confusion, hyperammonemia.


Behavioral concerns as well : wandering erratically.


As reported in nursing progress notes. Appreciated.


Noted nursing attendant sitting at bedside (1:1 watch).


Psychiatry-liaison will follow.


Ammonia level = 57.70 (7/24/20).

## 2020-07-24 NOTE — EKG
Test Reason : 

Blood Pressure : ***/*** mmHG

Vent. Rate : 107 BPM     Atrial Rate : 107 BPM

   P-R Int : 130 ms          QRS Dur : 092 ms

    QT Int : 346 ms       P-R-T Axes : [phone] degrees

   QTc Int : 461 ms

 

SINUS TACHYCARDIA

OTHERWISE NORMAL ECG

NO PREVIOUS ECGS AVAILABLE

Confirmed by GERALD JAVIER MD ([id number]) on 7/24/2020 11:20:04 AM

 

Referred By:             Confirmed By:GERALD JAVIER MD

## 2020-07-25 RX ADMIN — HYDROXYZINE PAMOATE SCH MG: 25 CAPSULE ORAL at 05:51

## 2020-07-25 RX ADMIN — Medication SCH: at 22:53

## 2020-07-25 RX ADMIN — LACTULOSE SCH GM: 20 SOLUTION ORAL at 10:47

## 2020-07-25 RX ADMIN — LACTULOSE SCH GM: 20 SOLUTION ORAL at 14:35

## 2020-07-25 RX ADMIN — IBUPROFEN PRN MG: 400 TABLET, FILM COATED ORAL at 05:52

## 2020-07-25 RX ADMIN — LACTULOSE SCH: 20 SOLUTION ORAL at 22:52

## 2020-07-25 RX ADMIN — HYDROXYZINE PAMOATE SCH: 25 CAPSULE ORAL at 22:53

## 2020-07-25 RX ADMIN — Medication SCH TAB: at 10:47

## 2020-07-25 RX ADMIN — HYDROXYZINE PAMOATE SCH: 25 CAPSULE ORAL at 14:36

## 2020-07-25 RX ADMIN — Medication SCH: at 22:52

## 2020-07-25 RX ADMIN — HYDROXYZINE PAMOATE SCH MG: 25 CAPSULE ORAL at 17:34

## 2020-07-25 RX ADMIN — LACTULOSE SCH GM: 20 SOLUTION ORAL at 17:33

## 2020-07-25 RX ADMIN — NICOTINE SCH MG: 7 PATCH TRANSDERMAL at 10:48

## 2020-07-25 RX ADMIN — HYDROXYZINE PAMOATE SCH MG: 25 CAPSULE ORAL at 10:48

## 2020-07-25 NOTE — PN
S CIWA





- CIWA Score


Nausea/Vomitin-No Nausea/No Vomiting


Muscle Tremors: None


Anxiety: 3


Agitation: 1-Slight > Activity


Paroxysmal Sweats: 2


Orientation: 2-Disoriented Date<2 days


Tacttile Disturbances: 2-Mild Itch/Numbness/Burn


Auditory Disturbances: 0-None


Visual Disturbances: 0-None


Headache: 0-None Present


CIWA-Ar Total Score: 10





BHS COWS





- Scale


Resting Pulse: 0= NH 80 or Below


Sweatin= Chills/Flushing


Restless Observation: 0= Sits Still


Pupil Size: 0= Normal to Room Light


Bone or Joint Aches: 2= Severe Diffuse Aches


Runny Nose/ Eye Tearin= None


GI Upset > 30mins: 2= Nausea/Diarrhea


Tremor Observation of Outstretched Hands: 2= Slight Tremor Visible


Yawning Observation: 0= None


Anxiety or Irritability: 2=Irritable/Anxious


Goose Flesh Skin: 0=Smooth Skin


COWS Score: 9





BHS Progress Note (SOAP)


Subjective: 





Anxious, Tremors, Sweating, Anxious.


Objective: 


Patient A & O X 2 (Uncertain About Current Day/Date). Patient Observed 

Ambulating on Detox Unit with assistance of a cane. In No Acute Distress.





20 14:57





                                   Vital Signs











Temperature  96.7 F L  20 13:00


 


Pulse Rate  64   20 13:00


 


Respiratory Rate  18   20 13:00


 


Blood Pressure  126/72   20 13:00


 


O2 Sat by Pulse Oximetry (%)  100   20 13:00











                                Laboratory Tests











  20





  17:00 08:15 08:15


 


WBC    7.3


 


RBC    4.44


 


Hgb    13.8


 


Hct    41.6


 


MCV    93.7


 


MCH    31.2


 


MCHC    33.3


 


RDW    14.0


 


Plt Count    227


 


MPV    10.2


 


Sodium   


 


Potassium   


 


Chloride   


 


Carbon Dioxide   


 


Anion Gap   


 


BUN   


 


Creatinine   


 


Est GFR (CKD-EPI)AfAm   


 


Est GFR (CKD-EPI)NonAf   


 


Random Glucose   


 


Hemoglobin A1c %   


 


Calcium   


 


Total Bilirubin   


 


Direct Bilirubin   


 


AST   


 


ALT   


 


Alkaline Phosphatase   


 


Ammonia   


 


Total Protein   


 


Albumin   


 


Syphilis Serology   Non-reactive 


 


COVID-19 ([place])  Not detected  


 


HIV Ag/Ab Combo Qual   














  20





  08:15 08:15 08:15


 


WBC   


 


RBC   


 


Hgb   


 


Hct   


 


MCV   


 


MCH   


 


MCHC   


 


RDW   


 


Plt Count   


 


MPV   


 


Sodium  141  


 


Potassium  3.8  


 


Chloride  106  


 


Carbon Dioxide  25  


 


Anion Gap  9  


 


BUN  11.4  


 


Creatinine  1.0  


 


Est GFR (CKD-EPI)AfAm  99.85  


 


Est GFR (CKD-EPI)NonAf  86.15  


 


Random Glucose  85  


 


Hemoglobin A1c %   5.6 


 


Calcium  9.2  


 


Total Bilirubin  0.9  


 


Direct Bilirubin  0.2  


 


AST  27  


 


ALT  32  


 


Alkaline Phosphatase  65  


 


Ammonia    57.70 H


 


Total Protein  7.6  


 


Albumin  4.2  


 


Syphilis Serology   


 


COVID-19 ([place])   


 


HIV Ag/Ab Combo Qual   














  20





  06:05 06:05 07:10


 


WBC   


 


RBC   


 


Hgb   


 


Hct   


 


MCV   


 


MCH   


 


MCHC   


 


RDW   


 


Plt Count   


 


MPV   


 


Sodium   


 


Potassium   


 


Chloride   


 


Carbon Dioxide   


 


Anion Gap   


 


BUN   


 


Creatinine   


 


Est GFR (CKD-EPI)AfAm   


 


Est GFR (CKD-EPI)NonAf   


 


Random Glucose   


 


Hemoglobin A1c %   


 


Calcium   


 


Total Bilirubin   


 


Direct Bilirubin   


 


AST   


 


ALT   


 


Alkaline Phosphatase   


 


Ammonia    59.00 H


 


Total Protein   


 


Albumin   


 


Syphilis Serology   Non-reactive 


 


COVID-19 ([place])   


 


HIV Ag/Ab Combo Qual  Negative  














Lab Results noted.


Assessment: 





20 14:58


WITHDRAWAL SYMPTOMS.


HYPERAMMONEMIA.


Plan: 





Continue Detox.


Increase Daily Oral Water Intake.





Patient previously maintained on 1:1 Continuous Observation for safety, due to 

difficulty with ambulation.


At time of Medical Assessment in AM today, Patient visualized ambulating with 

cane, reporting mild discomfort in his feet (due to chronic condition). 


Patient A & O X 2 (uncertain about current day/date), but appears to be aware of

surroundings and general environment.


Patient evaluated by Psychiatrist, who found no Psychiatric reason for Patient 

to be further maintained on 1:1.


1:1 Continuous Observation status D/C'd. Patient transferred to room close to 

Nurses' Station for safety and closer monitoring.


Wheelchair ordered for Patient to assist with movement on Detox Unit, if 

necessary.


Ammonia level noted to be slightly increased on repeat assessment today (59.00).

Current dosing schedule for Lactulose to be maintained for time being. Will re-

check Ammonia level again tomorrow AM.
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
## 2020-07-26 VITALS — SYSTOLIC BLOOD PRESSURE: 103 MMHG | TEMPERATURE: 97.1 F | DIASTOLIC BLOOD PRESSURE: 74 MMHG

## 2020-07-26 VITALS — HEART RATE: 110 BPM

## 2020-07-26 RX ADMIN — Medication SCH TAB: at 09:42

## 2020-07-26 RX ADMIN — LACTULOSE SCH: 20 SOLUTION ORAL at 09:44

## 2020-07-26 RX ADMIN — NICOTINE SCH MG: 7 PATCH TRANSDERMAL at 09:42

## 2020-07-26 RX ADMIN — HYDROXYZINE PAMOATE SCH: 25 CAPSULE ORAL at 06:03

## 2020-07-26 RX ADMIN — HYDROXYZINE PAMOATE SCH: 25 CAPSULE ORAL at 09:42

## 2020-07-26 NOTE — DS
BHS Detox Discharge Summary


Admission Date: 


20





Discharge Date: 20





- History


Present History: Alcohol Dependence, Opioid Dependence


Additional Comments: 





54 years old male admitted on 20 for alcohol  withdrawal sx management


treated with ativan detox regiment 


seen by psychiatrist elsie gaitanrexa remeron and wellbutrin


feeling better today prefers to leave the detox unit today that he will follow 

up with [facility] tomorrow


alert oriented x 3 speech clearly coherently ambulating with cane slow steady 





cardiac s1s2 regular rhythm


denies chest pain no shortness of breath 


                               Vital Signs - 24 hr











  20





  13:00 20:54 06:54


 


Temperature 96.7 F L 97.3 F L 97.8 F


 


Pulse Rate 64 93 H 110 H


 


Respiratory 18 18 16





Rate   


 


Blood Pressure 126/72 119/77 112/78


 


O2 Sat by Pulse 100 98 97





Oximetry (%)   














  20





  09:20


 


Temperature 97.1 F L


 


Pulse Rate 110 H


 


Respiratory 18





Rate 


 


Blood Pressure 103/74


 


O2 Sat by Pulse 97





Oximetry (%) 








respiratory clear lung sounds bilaterally on auscultation 


skin warm and dry 


Pertinent Past History: 





time for discharge 





treatment team met with the patient discussing the benefits of ativan regiment 

completion 





- Physical Exam Results


Vital Signs: 


                                   Vital Signs











Temperature  97.8 F   20 06:54


 


Pulse Rate  110 H  20 06:54


 


Respiratory Rate  16   20 06:54


 


Blood Pressure  112/78   20 06:54


 


O2 Sat by Pulse Oximetry (%)  97   20 06:54











Pertinent Admission Physical Exam Findings: 





alcohol withdrawal 


                                Laboratory Tests











  20





  17:00 08:15 08:15


 


WBC    7.3


 


RBC    4.44


 


Hgb    13.8


 


Hct    41.6


 


MCV    93.7


 


MCH    31.2


 


MCHC    33.3


 


RDW    14.0


 


Plt Count    227


 


MPV    10.2


 


Sodium   


 


Potassium   


 


Chloride   


 


Carbon Dioxide   


 


Anion Gap   


 


BUN   


 


Creatinine   


 


Est GFR (CKD-EPI)AfAm   


 


Est GFR (CKD-EPI)NonAf   


 


Random Glucose   


 


Hemoglobin A1c %   


 


Calcium   


 


Total Bilirubin   


 


Direct Bilirubin   


 


AST   


 


ALT   


 


Alkaline Phosphatase   


 


Ammonia   


 


Total Protein   


 


Albumin   


 


Syphilis Serology   Non-reactive 


 


COVID-19 ([place])  Not detected  


 


HIV Ag/Ab Combo Qual   














  0720





  08:15 08:15 08:15


 


WBC   


 


RBC   


 


Hgb   


 


Hct   


 


MCV   


 


MCH   


 


MCHC   


 


RDW   


 


Plt Count   


 


MPV   


 


Sodium  141  


 


Potassium  3.8  


 


Chloride  106  


 


Carbon Dioxide  25  


 


Anion Gap  9  


 


BUN  11.4  


 


Creatinine  1.0  


 


Est GFR (CKD-EPI)AfAm  99.85  


 


Est GFR (CKD-EPI)NonAf  86.15  


 


Random Glucose  85  


 


Hemoglobin A1c %   5.6 


 


Calcium  9.2  


 


Total Bilirubin  0.9  


 


Direct Bilirubin  0.2  


 


AST  27  


 


ALT  32  


 


Alkaline Phosphatase  65  


 


Ammonia    57.70 H


 


Total Protein  7.6  


 


Albumin  4.2  


 


Syphilis Serology   


 


COVID-19 ([place])   


 


HIV Ag/Ab Combo Qual   














  20





  06:05 06:05 07:10


 


WBC   


 


RBC   


 


Hgb   


 


Hct   


 


MCV   


 


MCH   


 


MCHC   


 


RDW   


 


Plt Count   


 


MPV   


 


Sodium   


 


Potassium   


 


Chloride   


 


Carbon Dioxide   


 


Anion Gap   


 


BUN   


 


Creatinine   


 


Est GFR (CKD-EPI)AfAm   


 


Est GFR (CKD-EPI)NonAf   


 


Random Glucose   


 


Hemoglobin A1c %   


 


Calcium   


 


Total Bilirubin   


 


Direct Bilirubin   


 


AST   


 


ALT   


 


Alkaline Phosphatase   


 


Ammonia    59.00 H


 


Total Protein   


 


Albumin   


 


Syphilis Serology   Non-reactive 


 


COVID-19 ([place])   


 


HIV Ag/Ab Combo Qual  Negative  








lab noted


ammonia elevation 


continue lactulose 





 lactulose from pharmacy contineu 4 times daily follow up ammonia repeat 

at [facility] Course: Detox Protocol Followed, Detoxed Safely, Responded well, Di

scharged Condition Good, Rehab Referral Accepted


Patient has Accepted a Rehab Referral to: [facility]





- Medication


Discharge Medications: 


Ambulatory Orders





Bupropion HCl [Wellbutrin Xl -] 300 mg PO DAILY 20 


Mirtazapine [Remeron -] 15 mg PO HS 20 


Olanzapine [Zyprexa -] 7.5 mg PO DAILY 20 


Lactulose (Oral Use) [Cephulac -] 20 gm PO QID #1 [facility] 20 











- Diagnosis


(1) Substance induced mood disorder


Status: Suspected   





(2) Alcohol use disorder


Status: Acute   





(3) Nicotine dependence


Status: Acute   


Qualifiers: 


   Nicotine product type: cigarettes   Substance use status: in withdrawal   

Qualified Code(s): F17.213 - Nicotine dependence, cigarettes, with withdrawal   





(4) Serum ammonia increased


Status: Chronic   





- AMA


Did Patient Leave Against Medical Advice: No





CIWA Score





- CIWA Score


Nausea/Vomitin-No Nausea/No Vomiting


Muscle Tremors: None


Anxiety: 3


Agitation: 1-Slight > Activity


Paroxysmal Sweats: 1-Minimal Palms Moist


Orientation: 0-Oriented


Tacttile Disturbances: 1-Very Mild Itch/Numbness


Auditory Disturbances: 0-None


Visual Disturbances: 0-None


Headache: 0-None Present


CIWA-Ar Total Score: 6





COWS (PN)





- Opiate Withdrawal


Resting Pulse: 2= -120


Sweatin= No chills or Flushing


Restless Observation: 0= Sits Still


Pupil Size: 0= Normal to Room Light


Bone or Joint Aches: 1= Mild Discomfort


Runny Nose/ Eye Tearin= None


GI Upset > 30mins: 0= None


Tremor Observation of Outstretched Hands: 1= Tremor Felt, Not Seen


Yawning Observation: 0= None


Anxiety or Irritability: 1=Feels Anxious/Irritable


Goose Flesh Skin: 0=Smooth Skin


COWS Score: 5

## 2023-04-04 NOTE — ED ADULT NURSE NOTE - CAS TRG GEN SKIN COLOR
[FreeTextEntry1] : Catina is a 3yo F w/ Lennox-Gastaut syndrome, hydrocephalus, spastic quadriplegia, g-tube dependent, with tracheostomy (on trach collar, off vent for 1 year) who presents for initial visit with complex care clinic. Referred by Dr. Sheets from neurology patient. Patient previously followed at [facility] and is transferring care to [facility]. Patient well-appearing in office today. Had two witness seizures, both about 15 seconds long, pt's eyes deviate to L side with tonic posturing. This is baseline per mom, occurs 20-30 times per day. Planning to start keto diet with neurology. Health record release forms obtained today to get records from [place] and [place]. Will plan to f/u in 1-2 months. \par \par Pulm: bronchopulmonary dysplasia, trach collar \par - follows w/ Dr. Paredes ([place]) \par - plans to continue to follow at [place] as there are plans for decannulation \par - continue budesonide 0.5 mg BID \par - continue albuterol BID \par \par \par Cards\par - cardiology referral \par \par Neuro\par - f/u with neurology nutritionist \par - continue keppra 450 mg BID \par - continue epidiolex 125 mg BID\par - continue rufinamide 120 mg BID \par \par GI: g-tube dependent \par - continue g-tube feeds: Compleat 1.0 (135 cc of formula mixed with 60 cc of water) every 4 hours (8am/12pm/4pm/8pm/12am)\par - continue omeprazole 10 mg BID  \par - referral to [facility] GI \par \par MSK: spasticity\par - refer to PM+R \par - continue valium 1.5 mg TID  \par \par ENT \par - needs sedated ABR - parent will f/u with [facility]NT \par \par Optho\par - referral today \par \par Developmental \par - sees PT/speech/feeding/special ed 3x/wk\par - needs OT, agency trying to find \par - approved for 126 hrs/wk of private duty nursing, receives about 70-80 hrs/wk 
Flushed

## 2023-12-29 ENCOUNTER — OFFICE VISIT (OUTPATIENT)
Dept: FAMILY MEDICINE CLINIC | Age: 58
End: 2023-12-29
Payer: COMMERCIAL

## 2023-12-29 VITALS
TEMPERATURE: 97.2 F | RESPIRATION RATE: 16 BRPM | DIASTOLIC BLOOD PRESSURE: 84 MMHG | BODY MASS INDEX: 22.36 KG/M2 | OXYGEN SATURATION: 98 % | HEART RATE: 58 BPM | HEIGHT: 69 IN | SYSTOLIC BLOOD PRESSURE: 126 MMHG | WEIGHT: 151 LBS

## 2023-12-29 DIAGNOSIS — Z59.82 INABILITY TO ACQUIRE TRANSPORTATION: ICD-10-CM

## 2023-12-29 DIAGNOSIS — Z11.4 ENCOUNTER FOR SCREENING FOR HIV: ICD-10-CM

## 2023-12-29 DIAGNOSIS — G62.9 NEUROPATHY: ICD-10-CM

## 2023-12-29 DIAGNOSIS — Z76.89 ENCOUNTER TO ESTABLISH CARE: ICD-10-CM

## 2023-12-29 DIAGNOSIS — Z13.220 LIPID SCREENING: ICD-10-CM

## 2023-12-29 DIAGNOSIS — F25.0 SCHIZOAFFECTIVE DISORDER, BIPOLAR TYPE (HCC): Primary | ICD-10-CM

## 2023-12-29 DIAGNOSIS — Z72.0 TOBACCO ABUSE: ICD-10-CM

## 2023-12-29 PROCEDURE — 99203 OFFICE O/P NEW LOW 30 MIN: CPT

## 2023-12-29 RX ORDER — BUPRENORPHINE AND NALOXONE 8; 2 MG/1; MG/1
1 FILM, SOLUBLE BUCCAL; SUBLINGUAL 2 TIMES DAILY
COMMUNITY
Start: 2023-10-03

## 2023-12-29 RX ORDER — OLANZAPINE 20 MG/1
30 TABLET ORAL DAILY
COMMUNITY
End: 2023-12-29 | Stop reason: SDUPTHER

## 2023-12-29 RX ORDER — BUPROPION HYDROCHLORIDE 150 MG/1
450 TABLET ORAL DAILY
COMMUNITY
Start: 2023-09-15 | End: 2023-12-29 | Stop reason: SDUPTHER

## 2023-12-29 RX ORDER — LIDOCAINE 50 MG/G
3 PATCH TOPICAL EVERY 24 HOURS
Qty: 90 PATCH | Refills: 0 | Status: SHIPPED | OUTPATIENT
Start: 2023-12-29

## 2023-12-29 RX ORDER — OLANZAPINE 20 MG/1
30 TABLET ORAL DAILY
Qty: 45 TABLET | Refills: 0 | Status: SHIPPED | OUTPATIENT
Start: 2023-12-29 | End: 2024-01-28

## 2023-12-29 RX ORDER — BUPROPION HYDROCHLORIDE 450 MG/1
450 TABLET, FILM COATED, EXTENDED RELEASE ORAL DAILY
Qty: 30 TABLET | Refills: 0 | Status: SHIPPED | OUTPATIENT
Start: 2023-12-29 | End: 2024-01-28

## 2023-12-29 RX ORDER — FLUTICASONE PROPIONATE 50 MCG
1 SPRAY, SUSPENSION (ML) NASAL DAILY
Qty: 32 G | Refills: 1 | Status: SHIPPED | OUTPATIENT
Start: 2023-12-29

## 2023-12-29 RX ORDER — MIRTAZAPINE 45 MG/1
45 TABLET, FILM COATED ORAL NIGHTLY
COMMUNITY
Start: 2015-11-24 | End: 2023-12-29 | Stop reason: SDUPTHER

## 2023-12-29 RX ORDER — GABAPENTIN 800 MG/1
800 TABLET ORAL 3 TIMES DAILY
Qty: 90 TABLET | Refills: 0 | Status: SHIPPED | OUTPATIENT
Start: 2023-12-29 | End: 2024-01-28

## 2023-12-29 RX ORDER — MIRTAZAPINE 45 MG/1
45 TABLET, FILM COATED ORAL NIGHTLY
Qty: 30 TABLET | Refills: 0 | Status: SHIPPED | OUTPATIENT
Start: 2023-12-29 | End: 2024-01-28

## 2023-12-29 RX ORDER — GABAPENTIN 800 MG/1
800 TABLET ORAL 3 TIMES DAILY
COMMUNITY
Start: 2023-10-04 | End: 2023-12-29 | Stop reason: SDUPTHER

## 2023-12-29 SDOH — ECONOMIC STABILITY - TRANSPORTATION SECURITY: TRANSPORTATION INSECURITY: Z59.82

## 2023-12-29 ASSESSMENT — PATIENT HEALTH QUESTIONNAIRE - PHQ9
1. LITTLE INTEREST OR PLEASURE IN DOING THINGS: 1
SUM OF ALL RESPONSES TO PHQ QUESTIONS 1-9: 2
SUM OF ALL RESPONSES TO PHQ9 QUESTIONS 1 & 2: 2
2. FEELING DOWN, DEPRESSED OR HOPELESS: 1
SUM OF ALL RESPONSES TO PHQ QUESTIONS 1-9: 2

## 2023-12-29 NOTE — PROGRESS NOTES
This patient was screened with SUBS screening tool.   On 12/29/2023 the patient has a Positive Screen and the result can be found scanned into the chart

## 2023-12-29 NOTE — PROGRESS NOTES
[facility]  Precepting Note    Subjective:  New patient  [place]oved from [place].   Has mood issues issues- schizoaffective Bipolar type  He is following at a addiction center  Has neuropathy- no hx of DM2  Smokes 1/2 PPD  Living in a motel  Having transportation issues  Has chronic nasal congestion- Flonase helps    ROS otherwise negative     Past medical, surgical, family and social history were reviewed, non-contributory, and unchanged unless otherwise stated.    Objective:    /84   Pulse 58   Temp 97.2 °F (36.2 °C) (Temporal)   Resp 16   Ht 1.753 m (5' 9\")   Wt 68.5 kg (151 lb)   SpO2 98%   BMI 22.30 kg/m²     Exam is as noted by resident with the following changes, additions or corrections:    General:  NAD; alert & oriented x 3   Heart:  RRR, no murmurs, gallops, or rubs.  Lungs:  CTA bilaterally, no wheeze, rales or rhonchi  Abd: bowel sounds present, nontender, nondistended, no masses  Extrem:  No clubbing, cyanosis, or edema    Assessment/Plan:  Establish care  Post nasal drainage: Flonase  Neuropathy: continue gabapentin  Mood disorder: continue the same, follow with psych  SW consult  Labs as ordered  HM: update  Record release     Attending Physician Statement  I have reviewed the chart, including any radiology or labs. I have discussed the case, including pertinent history and exam findings with the resident.  I agree with the assessment, plan and orders as documented by the resident.  Please refer to the resident note for additional information.      Electronically signed by DANIELA GUZMAN MD on 12/29/2023 at 11:11 AM

## 2023-12-29 NOTE — PROGRESS NOTES
[facility]  Department of Family Medicine  Family Medicine Residency Program      Patient: Ori Moralez  1965 58 y.o. male     Date of Service: 12/31/2023      Chief complaint:   Chief Complaint   Patient presents with    Establish Care     Recently moved here from [place], previous PCP Dr. Hudson Maciel    Bipolar     Appt with new psychiatrist 1/9, requesting refills until then    Gait Problem     Requesting order for single point cane       HISTORY OF PRESENTING ILLNESS     Ori Moralez is a 58 y.o. male presented to the clinic to establish with me.  Patient needs a new PCP     Patient is here to [facility] care-patient's medical, family, surgical, social history were reviewed.    Just moved from [place]     Has a new Suboxone now , was on subtex and methadone- started with [facility]  clinic doctor  History of opiod use , used to abuse his pain medication when he had his herniated disc had the diluaid,norcos and more   Sees the psychiatrist on the 1/9     Smokes daily   0.5 packs   No interest in quitting     Neuropathy   Bilateral legs, no history of DM   Cane     Nasal congestion   Constant on the right side     History of hep C   History of cocaine use     Currently living in a motel   NO SI, HI       Was kicked out of the pain management in the past for using   Is okay to start with that       Past Medical History:      Diagnosis Date    Disorder of rotator cuff of both shoulders     Herniation of lumbar intervertebral disc     Neuropathy     Opioid use disorder     Schizoaffective disorder, bipolar type (HCC)      Past Surgical History:        Procedure Laterality Date    PLEURAL SCARIFICATION      SKIN GRAFT       Allergies:    Cephalexin, Prochlorperazine, and Risperidone  Family History:       Problem Relation Age of Onset    COPD Mother      Review of Systems:   Review of Systems   Constitutional:  Negative for activity change, appetite change, fatigue and

## 2024-01-03 ENCOUNTER — TELEPHONE (OUTPATIENT)
Dept: FAMILY MEDICINE CLINIC | Age: 59
End: 2024-01-03

## 2024-01-03 NOTE — TELEPHONE ENCOUNTER
LSW made phone call to patient regarding social work referral for inability to obtain transportation.  Patient stated he asked to change his insurance today from [employer] to [place], called [employer] to check on transportation and was told his [employer] insurance was cancelled.  Patient's understanding was the new [place] insurance would be effective on first of the following month (Feb).      Patient needs transportation to go to [facility], Mon-Sat, for suboxone treatment.  Scheduled 1/9/24 for intake at [address] to establish with psychiatrist.         Currently renting a room for $625 month at [facility], close to [facility], no fixed route bus line, receives SSI $943 and to receive [employer] $  No family or friends except sister in [place].      Patient appeared to have insightful knowledge of his mental health diagnoses, stated he has Schizoaffective Disorder, which is difficult to manage by itself but also dealing with polysubstance abuse.   Has been sober for     911 to go to emergency room when having SI.  Stated he knows though that he can't be calling 911 all the time, so has to deal with.  and has to learn how to deal with having Provided Crisis Hotline and mental health support lines:  information and phones n  Help Network 24/7 Crisis Hotline  [facility]:  211 or 403-350-5986  [facility]:  [phone]  [facility]:  1-170.922.2156  [place] [facility] Hop[facility]:  1-702.257.1683  National Suicide Prevention Lifeline  1-448.167.7022  Crisis Text Line FREE 24/7 Support   Text:  365858  Peer Warm Line Mon-Fri 4pm-8pm; Sat 1pm-3pm   1-639.914.3584

## 2024-01-04 ENCOUNTER — TELEPHONE (OUTPATIENT)
Dept: FAMILY MEDICINE CLINIC | Age: 59
End: 2024-01-04

## 2024-01-04 NOTE — TELEPHONE ENCOUNTER
LSW made follow up phone call to patient.   Patient reported call had dropped yesterday with LSW, did get LSW VM message left afterwards.   Patient reported had transportation today after all from [employer] insurance to go to [facility], patient stated he didn't question because didn't want to mess up his rides for now.  LSW advised patient to have [facility] run patient's [employer] insurance to see if still active.  Patient stated got his [employer] card in the mail today, told registration at [facility] and they told patient to bring in [employer] card tomorrow.  Patient received $175 SNAP benefits today and a friend gave him $90.       Patient stated suboxone available from 6-11am and then afterwards would see counselor if he had appt, only saw counselor once at intake, stated not sure when is next session or about other parts of treatment program since he's new to the program.  LSW advised patient to inquire about treatment plan to be able to plan for transportation.    to plan to at least plan for transportation.       LSW discussed transportation options, patient is registered for [facility] but hasn't used it yet, believes he reported having a disability, stated his disability primary diagnosis is Schizoaffective Disorder, secondary Opioid Use Disorder and herniated disc in back.  LSW advised patient to contact LSW if received WRTA form to complete for disability transportation.      Informed patient of added value services and monetary incentives with new [employer] insurance, patient to contact [employer] regarding benefits and to request  and any other additional services and supports for behavioral health and substance abuse.     Discussed [employer] Tobacco Cessation program for when patient is ready to commit to stop smoking.   Referred patient to [employer] and [employer].   Encouraged patient to use  support and crisis hotlines discussed

## 2024-01-11 ENCOUNTER — TELEPHONE (OUTPATIENT)
Dept: FAMILY MEDICINE CLINIC | Age: 59
End: 2024-01-11

## 2024-01-11 RX ORDER — IBUPROFEN 800 MG/1
800 TABLET ORAL EVERY 6 HOURS PRN
Status: ON HOLD | COMMUNITY

## 2024-01-11 RX ORDER — CYCLOBENZAPRINE HCL 10 MG
10 TABLET ORAL 3 TIMES DAILY PRN
Status: ON HOLD | COMMUNITY

## 2024-01-11 RX ORDER — ALBUTEROL SULFATE 90 UG/1
2 AEROSOL, METERED RESPIRATORY (INHALATION) 2 TIMES DAILY PRN
COMMUNITY
End: 2024-01-11 | Stop reason: SDUPTHER

## 2024-01-11 NOTE — TELEPHONE ENCOUNTER
Patient states that he is having trouble getting Bupropion 450mg at [facility].  The pharmacy tells him that prior auth is needed.

## 2024-01-11 NOTE — TELEPHONE ENCOUNTER
PA was completed 1/10/2024 and we are awaiting a response from the insurance company which can take up to 10 business days. I called & spoke with patient who states he went to [facility] for an intake appointment and has an appointment w/NP on 2/1. I suggested he reach out to the pharmacy to see if they can give him an emergency supply so he doesn't run out of medication. Patient will let us know if he has any problems.

## 2024-01-12 NOTE — TELEPHONE ENCOUNTER
Fax received from Toby ([facility]) for Medicaid approving PA for Bupropion (see scan in media)    I reached out to [facility] who stated they don't have the prescription on file for this medication (it was to be transferred from [facility] according to patient). Pharmacy tech advised me she would call and have it transferred and will let us know if there are any problems.

## 2024-01-16 RX ORDER — ALBUTEROL SULFATE 90 UG/1
2 AEROSOL, METERED RESPIRATORY (INHALATION) 2 TIMES DAILY PRN
Qty: 18 G | Refills: 1 | Status: ON HOLD | OUTPATIENT
Start: 2024-01-16

## 2024-01-20 ENCOUNTER — APPOINTMENT (OUTPATIENT)
Dept: GENERAL RADIOLOGY | Age: 59
DRG: 351 | End: 2024-01-20
Payer: MEDICAID

## 2024-01-20 ENCOUNTER — APPOINTMENT (OUTPATIENT)
Dept: CT IMAGING | Age: 59
DRG: 351 | End: 2024-01-20
Payer: MEDICAID

## 2024-01-20 ENCOUNTER — HOSPITAL ENCOUNTER (INPATIENT)
Age: 59
LOS: 1 days | Discharge: HOSPICE | DRG: 351 | End: 2024-01-23
Attending: STUDENT IN AN ORGANIZED HEALTH CARE EDUCATION/TRAINING PROGRAM | Admitting: INTERNAL MEDICINE
Payer: MEDICAID

## 2024-01-20 DIAGNOSIS — M62.82 NON-TRAUMATIC RHABDOMYOLYSIS: Primary | ICD-10-CM

## 2024-01-20 DIAGNOSIS — M79.631 RIGHT FOREARM PAIN: ICD-10-CM

## 2024-01-20 DIAGNOSIS — R20.2 FACIAL PARESTHESIA: ICD-10-CM

## 2024-01-20 DIAGNOSIS — J18.9 PNEUMONIA DUE TO INFECTIOUS ORGANISM, UNSPECIFIED LATERALITY, UNSPECIFIED PART OF LUNG: ICD-10-CM

## 2024-01-20 LAB
ALBUMIN SERPL-MCNC: 4.4 G/DL (ref 3.5–5.2)
ALP SERPL-CCNC: 78 U/L (ref 40–129)
ALT SERPL-CCNC: 20 U/L (ref 0–40)
ANION GAP SERPL CALCULATED.3IONS-SCNC: 11 MMOL/L (ref 7–16)
APAP SERPL-MCNC: <5 UG/ML (ref 10–30)
AST SERPL-CCNC: 46 U/L (ref 0–39)
BASOPHILS # BLD: 0.01 K/UL (ref 0–0.2)
BASOPHILS NFR BLD: 0 % (ref 0–2)
BILIRUB DIRECT SERPL-MCNC: <0.2 MG/DL (ref 0–0.3)
BILIRUB INDIRECT SERPL-MCNC: ABNORMAL MG/DL (ref 0–1)
BILIRUB SERPL-MCNC: 0.3 MG/DL (ref 0–1.2)
BUN SERPL-MCNC: 16 MG/DL (ref 6–20)
CALCIUM SERPL-MCNC: 9.6 MG/DL (ref 8.6–10.2)
CHLORIDE SERPL-SCNC: 95 MMOL/L (ref 98–107)
CK SERPL-CCNC: 1802 U/L (ref 20–200)
CO2 SERPL-SCNC: 29 MMOL/L (ref 22–29)
CREAT SERPL-MCNC: 1 MG/DL (ref 0.7–1.2)
EOSINOPHIL # BLD: 0 K/UL (ref 0.05–0.5)
EOSINOPHILS RELATIVE PERCENT: 0 % (ref 0–6)
ERYTHROCYTE [DISTWIDTH] IN BLOOD BY AUTOMATED COUNT: 13.8 % (ref 11.5–15)
ETHANOLAMINE SERPL-MCNC: <10 MG/DL
GFR SERPL CREATININE-BSD FRML MDRD: >60 ML/MIN/1.73M2
GLUCOSE BLD-MCNC: 121 MG/DL (ref 74–99)
GLUCOSE SERPL-MCNC: 95 MG/DL (ref 74–99)
HCT VFR BLD AUTO: 44.3 % (ref 37–54)
HGB BLD-MCNC: 13.9 G/DL (ref 12.5–16.5)
IMM GRANULOCYTES # BLD AUTO: 0.03 K/UL (ref 0–0.58)
IMM GRANULOCYTES NFR BLD: 0 % (ref 0–5)
INR PPP: 1.1
LACTATE BLDV-SCNC: 2.1 MMOL/L (ref 0.5–2.2)
LYMPHOCYTES NFR BLD: 0.41 K/UL (ref 1.5–4)
LYMPHOCYTES RELATIVE PERCENT: 5 % (ref 20–42)
MAGNESIUM SERPL-MCNC: 2.1 MG/DL (ref 1.6–2.6)
MCH RBC QN AUTO: 30.8 PG (ref 26–35)
MCHC RBC AUTO-ENTMCNC: 31.4 G/DL (ref 32–34.5)
MCV RBC AUTO: 98 FL (ref 80–99.9)
MONOCYTES NFR BLD: 0.4 K/UL (ref 0.1–0.95)
MONOCYTES NFR BLD: 5 % (ref 2–12)
NEUTROPHILS NFR BLD: 90 % (ref 43–80)
NEUTS SEG NFR BLD: 7.93 K/UL (ref 1.8–7.3)
PLATELET # BLD AUTO: 209 K/UL (ref 130–450)
PMV BLD AUTO: 10.5 FL (ref 7–12)
POTASSIUM SERPL-SCNC: 5.2 MMOL/L (ref 3.5–5)
PROCALCITONIN SERPL-MCNC: 0.36 NG/ML (ref 0–0.08)
PROT SERPL-MCNC: 7.6 G/DL (ref 6.4–8.3)
PROTHROMBIN TIME: 12 SEC (ref 9.3–12.4)
RBC # BLD AUTO: 4.52 M/UL (ref 3.8–5.8)
RBC # BLD: ABNORMAL 10*6/UL
RBC # BLD: ABNORMAL 10*6/UL
SALICYLATES SERPL-MCNC: <0.3 MG/DL (ref 0–30)
SODIUM SERPL-SCNC: 135 MMOL/L (ref 132–146)
T4 FREE SERPL-MCNC: 1.1 NG/DL (ref 0.9–1.7)
TOXIC TRICYCLIC SC,BLOOD: NEGATIVE
TROPONIN I SERPL HS-MCNC: 67 NG/L (ref 0–11)
TSH SERPL DL<=0.05 MIU/L-ACNC: 0.75 UIU/ML (ref 0.27–4.2)
WBC OTHER # BLD: 8.8 K/UL (ref 4.5–11.5)

## 2024-01-20 PROCEDURE — 84484 ASSAY OF TROPONIN QUANT: CPT

## 2024-01-20 PROCEDURE — 82962 GLUCOSE BLOOD TEST: CPT

## 2024-01-20 PROCEDURE — 93005 ELECTROCARDIOGRAM TRACING: CPT | Performed by: STUDENT IN AN ORGANIZED HEALTH CARE EDUCATION/TRAINING PROGRAM

## 2024-01-20 PROCEDURE — 81001 URINALYSIS AUTO W/SCOPE: CPT

## 2024-01-20 PROCEDURE — 85610 PROTHROMBIN TIME: CPT

## 2024-01-20 PROCEDURE — 96360 HYDRATION IV INFUSION INIT: CPT

## 2024-01-20 PROCEDURE — 82248 BILIRUBIN DIRECT: CPT

## 2024-01-20 PROCEDURE — 71045 X-RAY EXAM CHEST 1 VIEW: CPT

## 2024-01-20 PROCEDURE — 83605 ASSAY OF LACTIC ACID: CPT

## 2024-01-20 PROCEDURE — 84145 PROCALCITONIN (PCT): CPT

## 2024-01-20 PROCEDURE — 82550 ASSAY OF CK (CPK): CPT

## 2024-01-20 PROCEDURE — G0480 DRUG TEST DEF 1-7 CLASSES: HCPCS

## 2024-01-20 PROCEDURE — 99285 EMERGENCY DEPT VISIT HI MDM: CPT

## 2024-01-20 PROCEDURE — 83735 ASSAY OF MAGNESIUM: CPT

## 2024-01-20 PROCEDURE — 80307 DRUG TEST PRSMV CHEM ANLYZR: CPT

## 2024-01-20 PROCEDURE — 80143 DRUG ASSAY ACETAMINOPHEN: CPT

## 2024-01-20 PROCEDURE — 70450 CT HEAD/BRAIN W/O DYE: CPT

## 2024-01-20 PROCEDURE — 0202U NFCT DS 22 TRGT SARS-COV-2: CPT

## 2024-01-20 PROCEDURE — 2580000003 HC RX 258: Performed by: STUDENT IN AN ORGANIZED HEALTH CARE EDUCATION/TRAINING PROGRAM

## 2024-01-20 PROCEDURE — 96361 HYDRATE IV INFUSION ADD-ON: CPT

## 2024-01-20 PROCEDURE — 85025 COMPLETE CBC W/AUTO DIFF WBC: CPT

## 2024-01-20 PROCEDURE — 84439 ASSAY OF FREE THYROXINE: CPT

## 2024-01-20 PROCEDURE — 84443 ASSAY THYROID STIM HORMONE: CPT

## 2024-01-20 PROCEDURE — 80053 COMPREHEN METABOLIC PANEL: CPT

## 2024-01-20 PROCEDURE — 80179 DRUG ASSAY SALICYLATE: CPT

## 2024-01-20 RX ORDER — LEVOFLOXACIN 5 MG/ML
750 INJECTION, SOLUTION INTRAVENOUS ONCE
Status: COMPLETED | OUTPATIENT
Start: 2024-01-21 | End: 2024-01-21

## 2024-01-20 RX ORDER — 0.9 % SODIUM CHLORIDE 0.9 %
1000 INTRAVENOUS SOLUTION INTRAVENOUS ONCE
Status: COMPLETED | OUTPATIENT
Start: 2024-01-20 | End: 2024-01-21

## 2024-01-20 RX ORDER — ACETAMINOPHEN 500 MG
1000 TABLET ORAL ONCE
Status: COMPLETED | OUTPATIENT
Start: 2024-01-20 | End: 2024-01-21

## 2024-01-20 RX ORDER — 0.9 % SODIUM CHLORIDE 0.9 %
1000 INTRAVENOUS SOLUTION INTRAVENOUS ONCE
Status: COMPLETED | OUTPATIENT
Start: 2024-01-20 | End: 2024-01-20

## 2024-01-20 RX ADMIN — SODIUM CHLORIDE 1000 ML: 9 INJECTION, SOLUTION INTRAVENOUS at 21:24

## 2024-01-21 PROBLEM — M62.82 RHABDOMYOLYSIS: Status: ACTIVE | Noted: 2024-01-21

## 2024-01-21 LAB
AMPHET UR QL SCN: NEGATIVE
ANION GAP SERPL CALCULATED.3IONS-SCNC: 13 MMOL/L (ref 7–16)
B PARAP IS1001 DNA NPH QL NAA+NON-PROBE: NOT DETECTED
B PERT DNA SPEC QL NAA+PROBE: NOT DETECTED
BARBITURATES UR QL SCN: NEGATIVE
BASOPHILS # BLD: 0.01 K/UL (ref 0–0.2)
BASOPHILS NFR BLD: 0 % (ref 0–2)
BENZODIAZ UR QL: NEGATIVE
BILIRUB UR QL STRIP: NEGATIVE
BUN SERPL-MCNC: 16 MG/DL (ref 6–20)
BUPRENORPHINE UR QL: NEGATIVE
C PNEUM DNA NPH QL NAA+NON-PROBE: NOT DETECTED
CALCIUM SERPL-MCNC: 8.7 MG/DL (ref 8.6–10.2)
CANNABINOIDS UR QL SCN: NEGATIVE
CHLORIDE SERPL-SCNC: 98 MMOL/L (ref 98–107)
CK SERPL-CCNC: 2517 U/L (ref 20–200)
CK SERPL-CCNC: 2542 U/L (ref 20–200)
CLARITY UR: CLEAR
CO2 SERPL-SCNC: 25 MMOL/L (ref 22–29)
COCAINE UR QL SCN: NEGATIVE
COLOR UR: YELLOW
CREAT SERPL-MCNC: 0.8 MG/DL (ref 0.7–1.2)
CRP SERPL HS-MCNC: 90 MG/L (ref 0–5)
EKG ATRIAL RATE: 98 BPM
EKG P AXIS: 76 DEGREES
EKG P-R INTERVAL: 132 MS
EKG Q-T INTERVAL: 362 MS
EKG QRS DURATION: 88 MS
EKG QTC CALCULATION (BAZETT): 462 MS
EKG R AXIS: 80 DEGREES
EKG T AXIS: 65 DEGREES
EKG VENTRICULAR RATE: 98 BPM
EOSINOPHIL # BLD: 0 K/UL (ref 0.05–0.5)
EOSINOPHILS RELATIVE PERCENT: 0 % (ref 0–6)
ERYTHROCYTE [DISTWIDTH] IN BLOOD BY AUTOMATED COUNT: 13.9 % (ref 11.5–15)
ERYTHROCYTE [SEDIMENTATION RATE] IN BLOOD BY WESTERGREN METHOD: 30 MM/HR (ref 0–15)
FENTANYL UR QL: NEGATIVE
FLUAV RNA NPH QL NAA+NON-PROBE: NOT DETECTED
FLUBV RNA NPH QL NAA+NON-PROBE: NOT DETECTED
GFR SERPL CREATININE-BSD FRML MDRD: >60 ML/MIN/1.73M2
GLUCOSE SERPL-MCNC: 116 MG/DL (ref 74–99)
GLUCOSE UR STRIP-MCNC: NEGATIVE MG/DL
HADV DNA NPH QL NAA+NON-PROBE: NOT DETECTED
HCOV 229E RNA NPH QL NAA+NON-PROBE: NOT DETECTED
HCOV HKU1 RNA NPH QL NAA+NON-PROBE: NOT DETECTED
HCOV NL63 RNA NPH QL NAA+NON-PROBE: NOT DETECTED
HCOV OC43 RNA NPH QL NAA+NON-PROBE: NOT DETECTED
HCT VFR BLD AUTO: 37.3 % (ref 37–54)
HGB BLD-MCNC: 12 G/DL (ref 12.5–16.5)
HGB UR QL STRIP.AUTO: ABNORMAL
HMPV RNA NPH QL NAA+NON-PROBE: NOT DETECTED
HPIV1 RNA NPH QL NAA+NON-PROBE: NOT DETECTED
HPIV2 RNA NPH QL NAA+NON-PROBE: NOT DETECTED
HPIV3 RNA NPH QL NAA+NON-PROBE: NOT DETECTED
HPIV4 RNA NPH QL NAA+NON-PROBE: NOT DETECTED
IMM GRANULOCYTES # BLD AUTO: 0.03 K/UL (ref 0–0.58)
IMM GRANULOCYTES NFR BLD: 0 % (ref 0–5)
KETONES UR STRIP-MCNC: NEGATIVE MG/DL
LACTATE BLDV-SCNC: 2.6 MMOL/L (ref 0.5–2.2)
LACTATE BLDV-SCNC: 3.2 MMOL/L (ref 0.5–2.2)
LEUKOCYTE ESTERASE UR QL STRIP: NEGATIVE
LYMPHOCYTES NFR BLD: 0.57 K/UL (ref 1.5–4)
LYMPHOCYTES RELATIVE PERCENT: 6 % (ref 20–42)
M PNEUMO DNA NPH QL NAA+NON-PROBE: NOT DETECTED
MCH RBC QN AUTO: 31.3 PG (ref 26–35)
MCHC RBC AUTO-ENTMCNC: 32.2 G/DL (ref 32–34.5)
MCV RBC AUTO: 97.1 FL (ref 80–99.9)
METHADONE UR QL: POSITIVE
MONOCYTES NFR BLD: 0.6 K/UL (ref 0.1–0.95)
MONOCYTES NFR BLD: 6 % (ref 2–12)
NEUTROPHILS NFR BLD: 87 % (ref 43–80)
NEUTS SEG NFR BLD: 8.3 K/UL (ref 1.8–7.3)
NITRITE UR QL STRIP: NEGATIVE
OPIATES UR QL SCN: NEGATIVE
OXYCODONE UR QL SCN: NEGATIVE
PCP UR QL SCN: NEGATIVE
PH UR STRIP: 6 [PH] (ref 5–9)
PHOSPHATE SERPL-MCNC: 2.6 MG/DL (ref 2.5–4.5)
PLATELET # BLD AUTO: 191 K/UL (ref 130–450)
PMV BLD AUTO: 11.3 FL (ref 7–12)
POTASSIUM SERPL-SCNC: 4.2 MMOL/L (ref 3.5–5)
PROT UR STRIP-MCNC: NEGATIVE MG/DL
RBC # BLD AUTO: 3.84 M/UL (ref 3.8–5.8)
RBC # BLD: ABNORMAL 10*6/UL
RBC #/AREA URNS HPF: ABNORMAL /HPF
RSV RNA NPH QL NAA+NON-PROBE: NOT DETECTED
RV+EV RNA NPH QL NAA+NON-PROBE: NOT DETECTED
SARS-COV-2 RNA NPH QL NAA+NON-PROBE: NOT DETECTED
SODIUM SERPL-SCNC: 136 MMOL/L (ref 132–146)
SP GR UR STRIP: >1.03 (ref 1–1.03)
SPECIMEN DESCRIPTION: NORMAL
TEST INFORMATION: ABNORMAL
TROPONIN I SERPL HS-MCNC: 35 NG/L (ref 0–11)
TROPONIN I SERPL HS-MCNC: 50 NG/L (ref 0–11)
TROPONIN I SERPL HS-MCNC: 71 NG/L (ref 0–11)
UROBILINOGEN UR STRIP-ACNC: 0.2 EU/DL (ref 0–1)
WBC #/AREA URNS HPF: ABNORMAL /HPF
WBC OTHER # BLD: 9.5 K/UL (ref 4.5–11.5)

## 2024-01-21 PROCEDURE — 6360000002 HC RX W HCPCS

## 2024-01-21 PROCEDURE — 87040 BLOOD CULTURE FOR BACTERIA: CPT

## 2024-01-21 PROCEDURE — 85025 COMPLETE CBC W/AUTO DIFF WBC: CPT

## 2024-01-21 PROCEDURE — 94640 AIRWAY INHALATION TREATMENT: CPT

## 2024-01-21 PROCEDURE — 2580000003 HC RX 258: Performed by: STUDENT IN AN ORGANIZED HEALTH CARE EDUCATION/TRAINING PROGRAM

## 2024-01-21 PROCEDURE — 86403 PARTICLE AGGLUT ANTBDY SCRN: CPT

## 2024-01-21 PROCEDURE — 80048 BASIC METABOLIC PNL TOTAL CA: CPT

## 2024-01-21 PROCEDURE — 84100 ASSAY OF PHOSPHORUS: CPT

## 2024-01-21 PROCEDURE — 85652 RBC SED RATE AUTOMATED: CPT

## 2024-01-21 PROCEDURE — 6370000000 HC RX 637 (ALT 250 FOR IP): Performed by: STUDENT IN AN ORGANIZED HEALTH CARE EDUCATION/TRAINING PROGRAM

## 2024-01-21 PROCEDURE — G0378 HOSPITAL OBSERVATION PER HR: HCPCS

## 2024-01-21 PROCEDURE — 6370000000 HC RX 637 (ALT 250 FOR IP)

## 2024-01-21 PROCEDURE — 6360000002 HC RX W HCPCS: Performed by: STUDENT IN AN ORGANIZED HEALTH CARE EDUCATION/TRAINING PROGRAM

## 2024-01-21 PROCEDURE — 82550 ASSAY OF CK (CPK): CPT

## 2024-01-21 PROCEDURE — 93010 ELECTROCARDIOGRAM REPORT: CPT | Performed by: INTERNAL MEDICINE

## 2024-01-21 PROCEDURE — 2580000003 HC RX 258

## 2024-01-21 PROCEDURE — 94664 DEMO&/EVAL PT USE INHALER: CPT

## 2024-01-21 PROCEDURE — 87081 CULTURE SCREEN ONLY: CPT

## 2024-01-21 PROCEDURE — 86140 C-REACTIVE PROTEIN: CPT

## 2024-01-21 PROCEDURE — 36415 COLL VENOUS BLD VENIPUNCTURE: CPT

## 2024-01-21 PROCEDURE — 83605 ASSAY OF LACTIC ACID: CPT

## 2024-01-21 PROCEDURE — 87205 SMEAR GRAM STAIN: CPT

## 2024-01-21 PROCEDURE — 96374 THER/PROPH/DIAG INJ IV PUSH: CPT

## 2024-01-21 PROCEDURE — 87070 CULTURE OTHR SPECIMN AEROBIC: CPT

## 2024-01-21 PROCEDURE — 99223 1ST HOSP IP/OBS HIGH 75: CPT | Performed by: INTERNAL MEDICINE

## 2024-01-21 PROCEDURE — 84484 ASSAY OF TROPONIN QUANT: CPT

## 2024-01-21 RX ORDER — ENOXAPARIN SODIUM 100 MG/ML
40 INJECTION SUBCUTANEOUS DAILY
Status: DISCONTINUED | OUTPATIENT
Start: 2024-01-21 | End: 2024-01-23 | Stop reason: HOSPADM

## 2024-01-21 RX ORDER — ONDANSETRON 4 MG/1
4 TABLET, ORALLY DISINTEGRATING ORAL EVERY 8 HOURS PRN
Status: DISCONTINUED | OUTPATIENT
Start: 2024-01-21 | End: 2024-01-23 | Stop reason: HOSPADM

## 2024-01-21 RX ORDER — SODIUM CHLORIDE 0.9 % (FLUSH) 0.9 %
5-40 SYRINGE (ML) INJECTION EVERY 12 HOURS SCHEDULED
Status: DISCONTINUED | OUTPATIENT
Start: 2024-01-21 | End: 2024-01-23 | Stop reason: HOSPADM

## 2024-01-21 RX ORDER — LANOLIN ALCOHOL/MO/W.PET/CERES
3 CREAM (GRAM) TOPICAL NIGHTLY
Status: DISCONTINUED | OUTPATIENT
Start: 2024-01-21 | End: 2024-01-23 | Stop reason: HOSPADM

## 2024-01-21 RX ORDER — ACETAMINOPHEN 650 MG/1
650 SUPPOSITORY RECTAL EVERY 6 HOURS PRN
Status: DISCONTINUED | OUTPATIENT
Start: 2024-01-21 | End: 2024-01-23 | Stop reason: HOSPADM

## 2024-01-21 RX ORDER — DEXTROSE MONOHYDRATE 100 MG/ML
INJECTION, SOLUTION INTRAVENOUS CONTINUOUS PRN
Status: DISCONTINUED | OUTPATIENT
Start: 2024-01-21 | End: 2024-01-23 | Stop reason: HOSPADM

## 2024-01-21 RX ORDER — LEVOFLOXACIN 750 MG/1
750 TABLET, FILM COATED ORAL DAILY
Status: DISCONTINUED | OUTPATIENT
Start: 2024-01-22 | End: 2024-01-23 | Stop reason: HOSPADM

## 2024-01-21 RX ORDER — SODIUM CHLORIDE 9 MG/ML
INJECTION, SOLUTION INTRAVENOUS CONTINUOUS
Status: ACTIVE | OUTPATIENT
Start: 2024-01-21 | End: 2024-01-21

## 2024-01-21 RX ORDER — SODIUM CHLORIDE 0.9 % (FLUSH) 0.9 %
5-40 SYRINGE (ML) INJECTION PRN
Status: DISCONTINUED | OUTPATIENT
Start: 2024-01-21 | End: 2024-01-23 | Stop reason: HOSPADM

## 2024-01-21 RX ORDER — SENNOSIDES A AND B 8.6 MG/1
2 TABLET, FILM COATED ORAL NIGHTLY
Status: DISCONTINUED | OUTPATIENT
Start: 2024-01-21 | End: 2024-01-23 | Stop reason: HOSPADM

## 2024-01-21 RX ORDER — METHADONE HYDROCHLORIDE 10 MG/1
60 TABLET ORAL DAILY
COMMUNITY

## 2024-01-21 RX ORDER — PANTOPRAZOLE SODIUM 40 MG/1
40 TABLET, DELAYED RELEASE ORAL
Status: DISCONTINUED | OUTPATIENT
Start: 2024-01-21 | End: 2024-01-23 | Stop reason: HOSPADM

## 2024-01-21 RX ORDER — SODIUM CHLORIDE 9 MG/ML
INJECTION, SOLUTION INTRAVENOUS PRN
Status: DISCONTINUED | OUTPATIENT
Start: 2024-01-21 | End: 2024-01-23 | Stop reason: HOSPADM

## 2024-01-21 RX ORDER — ONDANSETRON 2 MG/ML
4 INJECTION INTRAMUSCULAR; INTRAVENOUS EVERY 6 HOURS PRN
Status: DISCONTINUED | OUTPATIENT
Start: 2024-01-21 | End: 2024-01-23 | Stop reason: HOSPADM

## 2024-01-21 RX ORDER — METHADONE HYDROCHLORIDE 10 MG/ML
60 CONCENTRATE ORAL DAILY
Status: DISCONTINUED | OUTPATIENT
Start: 2024-01-21 | End: 2024-01-23 | Stop reason: HOSPADM

## 2024-01-21 RX ORDER — OLANZAPINE 10 MG/1
30 TABLET ORAL DAILY
Status: DISCONTINUED | OUTPATIENT
Start: 2024-01-21 | End: 2024-01-23 | Stop reason: HOSPADM

## 2024-01-21 RX ORDER — GABAPENTIN 400 MG/1
800 CAPSULE ORAL 3 TIMES DAILY
Status: DISCONTINUED | OUTPATIENT
Start: 2024-01-21 | End: 2024-01-23 | Stop reason: HOSPADM

## 2024-01-21 RX ORDER — MIRTAZAPINE 15 MG/1
45 TABLET, FILM COATED ORAL NIGHTLY
Status: DISCONTINUED | OUTPATIENT
Start: 2024-01-21 | End: 2024-01-23 | Stop reason: HOSPADM

## 2024-01-21 RX ORDER — ACETAMINOPHEN 325 MG/1
650 TABLET ORAL EVERY 6 HOURS PRN
Status: DISCONTINUED | OUTPATIENT
Start: 2024-01-21 | End: 2024-01-23 | Stop reason: HOSPADM

## 2024-01-21 RX ORDER — POLYETHYLENE GLYCOL 3350 17 G/17G
17 POWDER, FOR SOLUTION ORAL DAILY
Status: DISCONTINUED | OUTPATIENT
Start: 2024-01-21 | End: 2024-01-23 | Stop reason: HOSPADM

## 2024-01-21 RX ORDER — LEVOFLOXACIN 5 MG/ML
750 INJECTION, SOLUTION INTRAVENOUS EVERY 24 HOURS
Status: DISCONTINUED | OUTPATIENT
Start: 2024-01-22 | End: 2024-01-21

## 2024-01-21 RX ORDER — BUPROPION HYDROCHLORIDE 150 MG/1
450 TABLET ORAL DAILY
Status: DISCONTINUED | OUTPATIENT
Start: 2024-01-21 | End: 2024-01-23 | Stop reason: HOSPADM

## 2024-01-21 RX ORDER — IPRATROPIUM BROMIDE AND ALBUTEROL SULFATE 2.5; .5 MG/3ML; MG/3ML
1 SOLUTION RESPIRATORY (INHALATION)
Status: DISCONTINUED | OUTPATIENT
Start: 2024-01-21 | End: 2024-01-23 | Stop reason: HOSPADM

## 2024-01-21 RX ADMIN — ACETAMINOPHEN 1000 MG: 500 TABLET ORAL at 00:12

## 2024-01-21 RX ADMIN — MELATONIN 3 MG ORAL TABLET 3 MG: 3 TABLET ORAL at 21:29

## 2024-01-21 RX ADMIN — ENOXAPARIN SODIUM 40 MG: 100 INJECTION SUBCUTANEOUS at 09:12

## 2024-01-21 RX ADMIN — LEVOFLOXACIN 750 MG: 5 INJECTION, SOLUTION INTRAVENOUS at 00:13

## 2024-01-21 RX ADMIN — IPRATROPIUM BROMIDE AND ALBUTEROL SULFATE 1 DOSE: 2.5; .5 SOLUTION RESPIRATORY (INHALATION) at 12:29

## 2024-01-21 RX ADMIN — GABAPENTIN 800 MG: 400 CAPSULE ORAL at 14:13

## 2024-01-21 RX ADMIN — IPRATROPIUM BROMIDE AND ALBUTEROL SULFATE 1 DOSE: 2.5; .5 SOLUTION RESPIRATORY (INHALATION) at 19:28

## 2024-01-21 RX ADMIN — PANTOPRAZOLE SODIUM 40 MG: 40 TABLET, DELAYED RELEASE ORAL at 10:19

## 2024-01-21 RX ADMIN — POLYETHYLENE GLYCOL 3350 17 G: 17 POWDER, FOR SOLUTION ORAL at 09:12

## 2024-01-21 RX ADMIN — GABAPENTIN 800 MG: 400 CAPSULE ORAL at 21:29

## 2024-01-21 RX ADMIN — SODIUM CHLORIDE 1000 ML: 9 INJECTION, SOLUTION INTRAVENOUS at 00:12

## 2024-01-21 RX ADMIN — GABAPENTIN 800 MG: 400 CAPSULE ORAL at 09:12

## 2024-01-21 RX ADMIN — SODIUM CHLORIDE: 9 INJECTION, SOLUTION INTRAVENOUS at 04:07

## 2024-01-21 RX ADMIN — SODIUM CHLORIDE, PRESERVATIVE FREE 10 ML: 5 INJECTION INTRAVENOUS at 21:31

## 2024-01-21 RX ADMIN — OLANZAPINE 30 MG: 10 TABLET, FILM COATED ORAL at 10:19

## 2024-01-21 RX ADMIN — Medication 60 MG: at 16:47

## 2024-01-21 RX ADMIN — SODIUM CHLORIDE, PRESERVATIVE FREE 10 ML: 5 INJECTION INTRAVENOUS at 09:15

## 2024-01-21 RX ADMIN — BUPROPION HYDROCHLORIDE 450 MG: 150 TABLET, EXTENDED RELEASE ORAL at 10:19

## 2024-01-21 RX ADMIN — IPRATROPIUM BROMIDE AND ALBUTEROL SULFATE 1 DOSE: 2.5; .5 SOLUTION RESPIRATORY (INHALATION) at 15:17

## 2024-01-21 RX ADMIN — MIRTAZAPINE 45 MG: 15 TABLET, FILM COATED ORAL at 21:29

## 2024-01-21 RX ADMIN — SENNOSIDES 17.2 MG: 8.6 TABLET, FILM COATED ORAL at 21:30

## 2024-01-21 RX ADMIN — IPRATROPIUM BROMIDE AND ALBUTEROL SULFATE 1 DOSE: 2.5; .5 SOLUTION RESPIRATORY (INHALATION) at 08:59

## 2024-01-21 ASSESSMENT — PAIN SCALES - GENERAL
PAINLEVEL_OUTOF10: 0
PAINLEVEL_OUTOF10: 0
PAINLEVEL_OUTOF10: 6
PAINLEVEL_OUTOF10: 4

## 2024-01-21 ASSESSMENT — PAIN DESCRIPTION - LOCATION
LOCATION: BACK
LOCATION: ARM

## 2024-01-21 ASSESSMENT — PAIN DESCRIPTION - ORIENTATION
ORIENTATION: RIGHT;LOWER
ORIENTATION: RIGHT

## 2024-01-21 ASSESSMENT — PAIN DESCRIPTION - DESCRIPTORS
DESCRIPTORS: ACHING
DESCRIPTORS: ACHING;DISCOMFORT;SORE

## 2024-01-21 NOTE — ED PROVIDER NOTES
[facility] EMERGENCY DEPARTMENT  EMERGENCY DEPARTMENT ENCOUNTER        Pt Name: Ori Moralez  MRN: [id number]  Birthdate 1965  Date of evaluation: 1/20/2024  Provider: Bruce Walker DO  PCP: Emi Lea MD  Note Started: 8:10 PM EST 1/20/24    CHIEF COMPLAINT       Chief Complaint   Patient presents with    Numbness     Patient c/o facial numbness and R arm pain onset 7pm. Patient states he took 6 Neurontin pills today.     Arm Pain       HISTORY OF PRESENT ILLNESS: 1 or more Elements   History From: patient    Limitations to history : None    Ori Moralez is a 58 y.o. male who presents to the emergency department for multiple complaints including lower back pain that is not relieved with his at home gabapentin.  He also complains of facial numbness diffusely, pain to his right brachial region, lower back pain, numbness to his left hand.  He reports this started approximately 1 hour ago with numbness to his hand and face. Patient denies fever, chills, headache, shortness of breath, chest pain, abdominal pain, nausea, vomiting, diarrhea, lightheadedness, dysuria, hematuria, hematochezia, and melena.    Nursing Notes were all reviewed and agreed with or any disagreements were addressed in the HPI.    REVIEW OF SYSTEMS :           Positives and Pertinent negatives as per HPI.     SURGICAL HISTORY     Past Surgical History:   Procedure Laterality Date    PLEURAL SCARIFICATION      SKIN GRAFT         CURRENTMEDICATIONS       Previous Medications    ALBUTEROL SULFATE HFA (PROVENTIL;VENTOLIN;PROAIR) 108 (90 BASE) MCG/ACT INHALER    Inhale 2 puffs into the lungs 2 times daily as needed for Wheezing or Shortness of Breath    BUPRENORPHINE-NALOXONE (SUBOXONE) 8-2 MG FILM SL FILM    Place 1 Film under the tongue 2 times daily.    BUPROPION 450 MG TB24    Take 450 mg by mouth daily    CYCLOBENZAPRINE (FLEXERIL) 10 MG TABLET    Take 1 tablet by mouth 3 times daily as

## 2024-01-21 NOTE — FLOWSHEET NOTE
Pt presents to the ED secondary to chest pain that is radiating into the upper ABD, right arm and back. Pt states that the pain began one hour PTA. Pt is currently speaking in full sentences showing no signs of distress. Pt denies any dizziness or SOB. Pt shows no obvious signs of trauma and has no further complaints at this time.

## 2024-01-21 NOTE — ED NOTES
Pt care report provided to SHANEKA Max of [id number], who had no further questions at this time. Transport paged.

## 2024-01-21 NOTE — H&P
compartments soft and distal pulses intact.  No signs of muscle rigidity or fever as patient is on olanzapine.  Patient is on methadone and that is the only thing positive on his UDS, will confirm some dose and then prescribe as appropriate.  In the ED patient was given Tylenol, levofloxacin and 2L NC.    Consults:   none    Assessment:    Concern for CAP  Resp panel neg, procal 0.36, LA 2.1   Rhabdomyolysis   Hx shcizophrenia, bipolar, noncompliant but should be on wellbutrin  qd, remeron 45 nightly, zyprexa 30 nightly   Hx SI w/ plan by cutting wrist from command of auditory hallucination   Elevated troponin, no ischemic changes on EKG   Neuropathy of feet, gabapentin 800 TID   Hx hep C    Plan:    Confirm methadone 60 from [facility]  UDS   Cont fluids 150 cc/hr, trend CK, trace Hb in urine    Troponin trend   Cont abx, follow infetiious work up   Cont home psych meds     PT/OT evaluation: not indcated at this time  DVT prophylaxis: lovenox 40  GI prophylaxis: not indicated at this time  Bowel regimen: scheduled   Pain management: as needed   Diet: ADULT DIET; Regular  Code Status: Full Code   Disposition: admitted to inpatient     Raul Gibson MD, PGY-2  Attending physician: Dr. Heard  Case discussed with Dr. Bhardwaj

## 2024-01-22 LAB
ANION GAP SERPL CALCULATED.3IONS-SCNC: 14 MMOL/L (ref 7–16)
BASOPHILS # BLD: 0.02 K/UL (ref 0–0.2)
BASOPHILS NFR BLD: 0 % (ref 0–2)
BUN SERPL-MCNC: 10 MG/DL (ref 6–20)
CALCIUM SERPL-MCNC: 8.6 MG/DL (ref 8.6–10.2)
CHLORIDE SERPL-SCNC: 105 MMOL/L (ref 98–107)
CK SERPL-CCNC: 1897 U/L (ref 20–200)
CK SERPL-CCNC: 2495 U/L (ref 20–200)
CK SERPL-CCNC: 2554 U/L (ref 20–200)
CO2 SERPL-SCNC: 24 MMOL/L (ref 22–29)
CREAT SERPL-MCNC: 0.8 MG/DL (ref 0.7–1.2)
EOSINOPHIL # BLD: 0.02 K/UL (ref 0.05–0.5)
EOSINOPHILS RELATIVE PERCENT: 0 % (ref 0–6)
ERYTHROCYTE [DISTWIDTH] IN BLOOD BY AUTOMATED COUNT: 13.8 % (ref 11.5–15)
GFR SERPL CREATININE-BSD FRML MDRD: >60 ML/MIN/1.73M2
GLUCOSE SERPL-MCNC: 67 MG/DL (ref 74–99)
HCT VFR BLD AUTO: 36.5 % (ref 37–54)
HGB BLD-MCNC: 11.3 G/DL (ref 12.5–16.5)
IMM GRANULOCYTES # BLD AUTO: <0.03 K/UL (ref 0–0.58)
IMM GRANULOCYTES NFR BLD: 0 % (ref 0–5)
LACTATE BLDV-SCNC: 2.4 MMOL/L (ref 0.5–2.2)
LYMPHOCYTES NFR BLD: 1.12 K/UL (ref 1.5–4)
LYMPHOCYTES RELATIVE PERCENT: 14 % (ref 20–42)
MAGNESIUM SERPL-MCNC: 1.9 MG/DL (ref 1.6–2.6)
MCH RBC QN AUTO: 30.6 PG (ref 26–35)
MCHC RBC AUTO-ENTMCNC: 31 G/DL (ref 32–34.5)
MCV RBC AUTO: 98.9 FL (ref 80–99.9)
MICROORGANISM SPEC CULT: NORMAL
MONOCYTES NFR BLD: 0.82 K/UL (ref 0.1–0.95)
MONOCYTES NFR BLD: 10 % (ref 2–12)
NEUTROPHILS NFR BLD: 75 % (ref 43–80)
NEUTS SEG NFR BLD: 6.01 K/UL (ref 1.8–7.3)
PHOSPHATE SERPL-MCNC: 2.9 MG/DL (ref 2.5–4.5)
PLATELET # BLD AUTO: 152 K/UL (ref 130–450)
PMV BLD AUTO: 11.4 FL (ref 7–12)
POTASSIUM SERPL-SCNC: 3.9 MMOL/L (ref 3.5–5)
RBC # BLD AUTO: 3.69 M/UL (ref 3.8–5.8)
SODIUM SERPL-SCNC: 143 MMOL/L (ref 132–146)
SPECIMEN DESCRIPTION: NORMAL
WBC OTHER # BLD: 8 K/UL (ref 4.5–11.5)

## 2024-01-22 PROCEDURE — 99232 SBSQ HOSP IP/OBS MODERATE 35: CPT | Performed by: INTERNAL MEDICINE

## 2024-01-22 PROCEDURE — 2580000003 HC RX 258

## 2024-01-22 PROCEDURE — 83735 ASSAY OF MAGNESIUM: CPT

## 2024-01-22 PROCEDURE — 6370000000 HC RX 637 (ALT 250 FOR IP)

## 2024-01-22 PROCEDURE — 36415 COLL VENOUS BLD VENIPUNCTURE: CPT

## 2024-01-22 PROCEDURE — G0378 HOSPITAL OBSERVATION PER HR: HCPCS

## 2024-01-22 PROCEDURE — 82550 ASSAY OF CK (CPK): CPT

## 2024-01-22 PROCEDURE — 84100 ASSAY OF PHOSPHORUS: CPT

## 2024-01-22 PROCEDURE — 94640 AIRWAY INHALATION TREATMENT: CPT

## 2024-01-22 PROCEDURE — 6360000002 HC RX W HCPCS

## 2024-01-22 PROCEDURE — 85025 COMPLETE CBC W/AUTO DIFF WBC: CPT

## 2024-01-22 PROCEDURE — 83605 ASSAY OF LACTIC ACID: CPT

## 2024-01-22 PROCEDURE — 80048 BASIC METABOLIC PNL TOTAL CA: CPT

## 2024-01-22 RX ORDER — SODIUM CHLORIDE 9 MG/ML
INJECTION, SOLUTION INTRAVENOUS CONTINUOUS
Status: ACTIVE | OUTPATIENT
Start: 2024-01-22 | End: 2024-01-22

## 2024-01-22 RX ADMIN — IPRATROPIUM BROMIDE AND ALBUTEROL SULFATE 1 DOSE: 2.5; .5 SOLUTION RESPIRATORY (INHALATION) at 21:01

## 2024-01-22 RX ADMIN — SODIUM CHLORIDE, PRESERVATIVE FREE 10 ML: 5 INJECTION INTRAVENOUS at 09:34

## 2024-01-22 RX ADMIN — ENOXAPARIN SODIUM 40 MG: 100 INJECTION SUBCUTANEOUS at 09:35

## 2024-01-22 RX ADMIN — SENNOSIDES 17.2 MG: 8.6 TABLET, FILM COATED ORAL at 20:41

## 2024-01-22 RX ADMIN — POLYETHYLENE GLYCOL 3350 17 G: 17 POWDER, FOR SOLUTION ORAL at 09:33

## 2024-01-22 RX ADMIN — Medication 60 MG: at 09:32

## 2024-01-22 RX ADMIN — LEVOFLOXACIN 750 MG: 750 TABLET, FILM COATED ORAL at 09:34

## 2024-01-22 RX ADMIN — OLANZAPINE 30 MG: 10 TABLET, FILM COATED ORAL at 09:34

## 2024-01-22 RX ADMIN — GABAPENTIN 800 MG: 400 CAPSULE ORAL at 20:41

## 2024-01-22 RX ADMIN — SODIUM CHLORIDE: 9 INJECTION, SOLUTION INTRAVENOUS at 09:47

## 2024-01-22 RX ADMIN — GABAPENTIN 800 MG: 400 CAPSULE ORAL at 09:33

## 2024-01-22 RX ADMIN — GABAPENTIN 800 MG: 400 CAPSULE ORAL at 14:29

## 2024-01-22 RX ADMIN — BUPROPION HYDROCHLORIDE 450 MG: 150 TABLET, EXTENDED RELEASE ORAL at 09:33

## 2024-01-22 RX ADMIN — MELATONIN 3 MG ORAL TABLET 3 MG: 3 TABLET ORAL at 20:41

## 2024-01-22 RX ADMIN — MIRTAZAPINE 45 MG: 15 TABLET, FILM COATED ORAL at 20:42

## 2024-01-22 RX ADMIN — IPRATROPIUM BROMIDE AND ALBUTEROL SULFATE 1 DOSE: 2.5; .5 SOLUTION RESPIRATORY (INHALATION) at 09:42

## 2024-01-22 NOTE — CARE COORDINATION
01/22/24 Transition of care:  Pt admitted from ED for rhabdo CK 2495 lactic acid 2.4 NSS at 150ml/hr.  Met with pt to discuss transition of care Pt states that he rents a room at the [facility] and get his methadone from [facility].  Pt is independent with ADLs and uses his insurance for rides to the methadone clinic. Pt has [employer] and not [employer] notified [employer] Pt plan is to return to the [facility] states he has no jacket and no transportation.  Insurance card indicates pt has transportation services [phone] CM to follow  Electronically signed by Rafa Mckeon RN CM on 1/22/2024 at 10:03 AM       Case Management Assessment  Initial Evaluation    Date/Time of Evaluation: 1/22/2024 10:03 AM  Assessment Completed by: Rafa Mckeon RN    If patient is discharged prior to next notation, then this note serves as note for discharge by case management.    Patient Name: Ori Moralez                   YOB: 1965  Diagnosis: Rhabdomyolysis [M62.82]  Right forearm pain [M79.631]  Non-traumatic rhabdomyolysis [M62.82]  Facial paresthesia [R20.2]  Pneumonia due to infectious organism, unspecified laterality, unspecified part of lung [J18.9]                   Date / Time: 1/20/2024  8:01 PM    Patient Admission Status: Observation   Readmission Risk (Low < 19, Mod (19-27), High > 27): No data recorded  Current PCP: Emi Lea MD  PCP verified by ? Yes    Chart Reviewed: Yes      History Provided by: Patient  Patient Orientation: Alert and Oriented    Patient Cognition: Alert    Hospitalization in the last 30 days (Readmission):  No    If yes, Readmission Assessment in  Navigator will be completed.    Advance Directives:      Code Status: Full Code   Patient's Primary Decision Maker is: Legal Next of Kin      Discharge Planning:    Patient lives with: Alone Type of Home: Apartment  Primary Care Giver: Self  Patient Support Systems include: /Social  [facility] Smokers Quitline ([phone])

## 2024-01-22 NOTE — PLAN OF CARE
Called [facility] @  [phone] , spoke with JOSEPHINE Leonard, confirmed that patient is getting Methadone from them everyday, except for 1/21 when patient has a take home dose of 60mg due to facility was closed. Confirmed that it is okay to give methadone 60mg inpatient.

## 2024-01-23 VITALS
WEIGHT: 144.62 LBS | SYSTOLIC BLOOD PRESSURE: 130 MMHG | BODY MASS INDEX: 20.25 KG/M2 | DIASTOLIC BLOOD PRESSURE: 87 MMHG | TEMPERATURE: 98.1 F | HEART RATE: 95 BPM | OXYGEN SATURATION: 93 % | RESPIRATION RATE: 16 BRPM | HEIGHT: 71 IN

## 2024-01-23 LAB
ALBUMIN SERPL-MCNC: 3.9 G/DL (ref 3.5–5.2)
ALP SERPL-CCNC: 85 U/L (ref 40–129)
ALT SERPL-CCNC: 28 U/L (ref 0–40)
ANION GAP SERPL CALCULATED.3IONS-SCNC: 10 MMOL/L (ref 7–16)
ANION GAP SERPL CALCULATED.3IONS-SCNC: 7 MMOL/L (ref 7–16)
AST SERPL-CCNC: 52 U/L (ref 0–39)
BASOPHILS # BLD: 0.04 K/UL (ref 0–0.2)
BASOPHILS NFR BLD: 1 % (ref 0–2)
BILIRUB SERPL-MCNC: 0.4 MG/DL (ref 0–1.2)
BUN SERPL-MCNC: 10 MG/DL (ref 6–20)
BUN SERPL-MCNC: 8 MG/DL (ref 6–20)
CALCIUM SERPL-MCNC: 9.3 MG/DL (ref 8.6–10.2)
CALCIUM SERPL-MCNC: 9.9 MG/DL (ref 8.6–10.2)
CHLORIDE SERPL-SCNC: 101 MMOL/L (ref 98–107)
CHLORIDE SERPL-SCNC: 102 MMOL/L (ref 98–107)
CK SERPL-CCNC: 1009 U/L (ref 20–200)
CK SERPL-CCNC: 1070 U/L (ref 20–200)
CO2 SERPL-SCNC: 30 MMOL/L (ref 22–29)
CO2 SERPL-SCNC: 33 MMOL/L (ref 22–29)
CREAT SERPL-MCNC: 0.8 MG/DL (ref 0.7–1.2)
CREAT SERPL-MCNC: 0.8 MG/DL (ref 0.7–1.2)
EKG ATRIAL RATE: 91 BPM
EKG P AXIS: 74 DEGREES
EKG P-R INTERVAL: 126 MS
EKG Q-T INTERVAL: 372 MS
EKG QRS DURATION: 90 MS
EKG QTC CALCULATION (BAZETT): 457 MS
EKG R AXIS: 56 DEGREES
EKG T AXIS: 61 DEGREES
EKG VENTRICULAR RATE: 91 BPM
EOSINOPHIL # BLD: 0.11 K/UL (ref 0.05–0.5)
EOSINOPHILS RELATIVE PERCENT: 2 % (ref 0–6)
ERYTHROCYTE [DISTWIDTH] IN BLOOD BY AUTOMATED COUNT: 13.5 % (ref 11.5–15)
GFR SERPL CREATININE-BSD FRML MDRD: >60 ML/MIN/1.73M2
GFR SERPL CREATININE-BSD FRML MDRD: >60 ML/MIN/1.73M2
GLUCOSE SERPL-MCNC: 123 MG/DL (ref 74–99)
GLUCOSE SERPL-MCNC: 80 MG/DL (ref 74–99)
HCT VFR BLD AUTO: 42.8 % (ref 37–54)
HGB BLD-MCNC: 13.7 G/DL (ref 12.5–16.5)
IMM GRANULOCYTES # BLD AUTO: 0.03 K/UL (ref 0–0.58)
IMM GRANULOCYTES NFR BLD: 1 % (ref 0–5)
LYMPHOCYTES NFR BLD: 1.4 K/UL (ref 1.5–4)
LYMPHOCYTES RELATIVE PERCENT: 21 % (ref 20–42)
MAGNESIUM SERPL-MCNC: 2.2 MG/DL (ref 1.6–2.6)
MCH RBC QN AUTO: 31.1 PG (ref 26–35)
MCHC RBC AUTO-ENTMCNC: 32 G/DL (ref 32–34.5)
MCV RBC AUTO: 97.3 FL (ref 80–99.9)
MICROORGANISM SPEC CULT: ABNORMAL
MICROORGANISM SPEC CULT: ABNORMAL
MICROORGANISM/AGENT SPEC: ABNORMAL
MONOCYTES NFR BLD: 0.84 K/UL (ref 0.1–0.95)
MONOCYTES NFR BLD: 13 % (ref 2–12)
NEUTROPHILS NFR BLD: 63 % (ref 43–80)
NEUTS SEG NFR BLD: 4.14 K/UL (ref 1.8–7.3)
PHOSPHATE SERPL-MCNC: 3.3 MG/DL (ref 2.5–4.5)
PLATELET # BLD AUTO: 207 K/UL (ref 130–450)
PMV BLD AUTO: 11.6 FL (ref 7–12)
POTASSIUM SERPL-SCNC: 4.4 MMOL/L (ref 3.5–5)
POTASSIUM SERPL-SCNC: 5.2 MMOL/L (ref 3.5–5)
PROT SERPL-MCNC: 7.2 G/DL (ref 6.4–8.3)
RBC # BLD AUTO: 4.4 M/UL (ref 3.8–5.8)
SODIUM SERPL-SCNC: 141 MMOL/L (ref 132–146)
SODIUM SERPL-SCNC: 142 MMOL/L (ref 132–146)
SPECIMEN DESCRIPTION: ABNORMAL
WBC OTHER # BLD: 6.6 K/UL (ref 4.5–11.5)

## 2024-01-23 PROCEDURE — 6370000000 HC RX 637 (ALT 250 FOR IP)

## 2024-01-23 PROCEDURE — G0378 HOSPITAL OBSERVATION PER HR: HCPCS

## 2024-01-23 PROCEDURE — 83735 ASSAY OF MAGNESIUM: CPT

## 2024-01-23 PROCEDURE — 93005 ELECTROCARDIOGRAM TRACING: CPT

## 2024-01-23 PROCEDURE — 80048 BASIC METABOLIC PNL TOTAL CA: CPT

## 2024-01-23 PROCEDURE — 84100 ASSAY OF PHOSPHORUS: CPT

## 2024-01-23 PROCEDURE — 2580000003 HC RX 258

## 2024-01-23 PROCEDURE — 93010 ELECTROCARDIOGRAM REPORT: CPT | Performed by: INTERNAL MEDICINE

## 2024-01-23 PROCEDURE — 82550 ASSAY OF CK (CPK): CPT

## 2024-01-23 PROCEDURE — 6360000002 HC RX W HCPCS

## 2024-01-23 PROCEDURE — 99239 HOSP IP/OBS DSCHRG MGMT >30: CPT | Performed by: INTERNAL MEDICINE

## 2024-01-23 PROCEDURE — 80053 COMPREHEN METABOLIC PANEL: CPT

## 2024-01-23 PROCEDURE — 94640 AIRWAY INHALATION TREATMENT: CPT

## 2024-01-23 PROCEDURE — 85025 COMPLETE CBC W/AUTO DIFF WBC: CPT

## 2024-01-23 PROCEDURE — 1200000000 HC SEMI PRIVATE

## 2024-01-23 PROCEDURE — 36415 COLL VENOUS BLD VENIPUNCTURE: CPT

## 2024-01-23 RX ORDER — LEVOFLOXACIN 750 MG/1
750 TABLET, FILM COATED ORAL DAILY
Qty: 2 TABLET | Refills: 0 | Status: SHIPPED | OUTPATIENT
Start: 2024-01-23 | End: 2024-01-25

## 2024-01-23 RX ADMIN — SODIUM CHLORIDE, PRESERVATIVE FREE 10 ML: 5 INJECTION INTRAVENOUS at 10:21

## 2024-01-23 RX ADMIN — IPRATROPIUM BROMIDE AND ALBUTEROL SULFATE 1 DOSE: 2.5; .5 SOLUTION RESPIRATORY (INHALATION) at 07:43

## 2024-01-23 RX ADMIN — SODIUM ZIRCONIUM CYCLOSILICATE 10 G: 10 POWDER, FOR SUSPENSION ORAL at 10:21

## 2024-01-23 RX ADMIN — ENOXAPARIN SODIUM 40 MG: 100 INJECTION SUBCUTANEOUS at 10:06

## 2024-01-23 RX ADMIN — GABAPENTIN 800 MG: 400 CAPSULE ORAL at 10:04

## 2024-01-23 RX ADMIN — POLYETHYLENE GLYCOL 3350 17 G: 17 POWDER, FOR SOLUTION ORAL at 10:04

## 2024-01-23 RX ADMIN — LEVOFLOXACIN 750 MG: 750 TABLET, FILM COATED ORAL at 10:05

## 2024-01-23 RX ADMIN — PANTOPRAZOLE SODIUM 40 MG: 40 TABLET, DELAYED RELEASE ORAL at 06:51

## 2024-01-23 RX ADMIN — BUPROPION HYDROCHLORIDE 450 MG: 150 TABLET, EXTENDED RELEASE ORAL at 10:05

## 2024-01-23 RX ADMIN — IPRATROPIUM BROMIDE AND ALBUTEROL SULFATE 1 DOSE: 2.5; .5 SOLUTION RESPIRATORY (INHALATION) at 12:56

## 2024-01-23 RX ADMIN — OLANZAPINE 30 MG: 10 TABLET, FILM COATED ORAL at 10:05

## 2024-01-23 RX ADMIN — Medication 60 MG: at 10:00

## 2024-01-23 ASSESSMENT — PAIN DESCRIPTION - ORIENTATION: ORIENTATION: LOWER;RIGHT

## 2024-01-23 ASSESSMENT — PAIN DESCRIPTION - DESCRIPTORS: DESCRIPTORS: ACHING;DISCOMFORT;TENDER

## 2024-01-23 ASSESSMENT — PAIN SCALES - GENERAL: PAINLEVEL_OUTOF10: 6

## 2024-01-23 ASSESSMENT — PAIN DESCRIPTION - LOCATION: LOCATION: BACK

## 2024-01-23 ASSESSMENT — PAIN - FUNCTIONAL ASSESSMENT: PAIN_FUNCTIONAL_ASSESSMENT: PREVENTS OR INTERFERES WITH ALL ACTIVE AND SOME PASSIVE ACTIVITIES

## 2024-01-23 NOTE — PLAN OF CARE
Problem: Pain  Goal: Verbalizes/displays adequate comfort level or baseline comfort level  Outcome: Adequate for Discharge     Problem: Safety - Adult  Goal: Free from fall injury  Outcome: Adequate for Discharge      Negative

## 2024-01-23 NOTE — CARE COORDINATION
Patient remains hospitalized with rhabdomyolysis. Total CK 1,070 down from 1897. Repeat CK ordered for this afternoon. Patient will need transportation set up with his insurance at 1 [phone] when medically cleared This CM called and patient does qualify for transport. Sweatshirt and pants provided to patient from pastoral care. CM/SW will continue to follow

## 2024-01-23 NOTE — DISCHARGE SUMMARY
[facility]  Discharge Summary    PCP: Emi Lea MD    Admit Date:1/20/2024  Discharge Date: 1/23/2024    Admission Diagnosis:   Concern for CAP  Rhabdomyolysis   Hx shcizophrenia, bipolar, noncompliant but should be on wellbutrin  qd, remeron 45 nightly, zyprexa 30 nightly   Hx SI w/ plan by cutting wrist from command of auditory hallucination   Elevated troponin, no ischemic changes on EKG   Neuropathy of feet, gabapentin 800 TID   Hx hep C    Discharge Diagnosis:  Concern for CAP, stable - patchy bilateral lung opacities  Elevated CK likely 2/2 rhabdomyolysis - improved  Lactic acidosis - improved  R gluteal pain likely muscular, improved  R arm pain, improved  Hx of Substance use, currently on methadone   Hx of Schizophrenia, bipolar disorder - on Wellbutrin, Remeron, Zyprexa  Elevated troponin, no delta   Lower extremity neuropathy, on Gabapentin   Hx of Hep C     Hospital Course:       Ori Moralez is a 58 y.o. male with PMHx of schizophrenia, bipolar, neuropathy, polysubstance abuse on methadone came in with a CC of right arm and right buttock pain, nonradiating. Denies strenuous activity or trauma to either area prior to onset of symptoms. He denies any history of stroke, NIHSS 0. No focal neurological deficits but reports tenderness on R gluteal area and R am. No noted erythema or swelling on affected area.     Initial imaging CT head showed no hemorrhage. CXR showed patchy lesions with concerns for pneumonia. Respiratory panel negative. Noted mild cough but was nonproductive. Patient was afebrile with no leukocytosis, LA 2.1. Noted elevated CK 1800. UDS (+) for methadone which patient has been getting from [facility]. Initially elevated troponin but downtrended with no significant delta. No noted muscle rigidity or signs of compartment syndrome. Patient was given Levofloxacin to cover for CAP. Other home medications were

## 2024-01-23 NOTE — DISCHARGE INSTRUCTIONS
Internal medicine    Follow ups  Please follow up with your primary care physician (Dr. Lea) within 10 days of discharge from hospital. Please call as soon as possible to make an appointment. Please contact the internal medicine clinic for an appointment if you are unable to get an appointment with your PCP.   Please keep all other follow up appointments:  No future appointments.     Changes in healthcare   Please take all medications as indicated  Diet: regular diet   Activity: activity as tolerated  New Medications started during this hospital stay  Levofloxacin (antibiotic) until January 25, 2024  Changes to your medications  None  Medications you should stop taking   None  Additional labs, testing or imaging needed after discharge   Repeat Creatine Kinase  Consider repeat BMP to check Potassium level  Recommend repeat chest imaging (CXR) in a few weeks   Even if you are feeling better and not having symptoms do not stop taking antibiotic earlier then prescribed.  Please contact us if you have any concerns, wish to change or make an appointment:  Internal medicine clinic   Phone: [phone]  Fax: [phone] [address] [facility] [address]  Should you have further questions in regards to this visit, you can review your clinical note and after visit summary document on your "[employer] account.     Other than any new prescriptions given to you today, the list of home medications on this After Visit Summary are based on information provided to us from you and your healthcare providers. This information, including the list, dose, and frequency of medications is only as accurate as the information you provided. If you have any questions or concerns about your home medications, please contact your Primary Care Physician for further clarification.

## 2024-01-23 NOTE — PROGRESS NOTES
4 Eyes Skin Assessment     NAME:  Ori Moralez  YOB: 1965  MEDICAL RECORD NUMBER:  [id number]    The patient is being assessed for  Admission    I agree that at least one RN has performed a thorough Head to Toe Skin Assessment on the patient. ALL assessment sites listed below have been assessed.      Areas assessed by both nurses:    Head, Face, Ears, Shoulders, Back, Chest, Arms, Elbows, Hands, Sacrum. Buttock, Coccyx, Ischium, and Legs. Feet and Heels        Does the Patient have a Wound? No noted wound(s)       Jeff Prevention initiated by RN: No  Wound Care Orders initiated by RN: No    Pressure Injury (Stage 3,4, Unstageable, DTI, NWPT, and Complex wounds) if present, place Wound referral order by RN under :No    New Ostomies, if present place, Ostomy referral order under : No     Nurse 1 eSignature: Electronically signed by Rocky Cardona RN on 1/21/24 at 4:33 AM EST    **SHARE this note so that the co-signing nurse can place an eSignature**    Nurse 2 eSignature: Electronically signed by Isamar Hernandez RN on 1/21/24 at 4:36 AM EST    
Confirmed methadone 60 mg daily from [facility].  Sayra from [facility] returned phone call to confirm dose.   Babak MainD, [facility], BCPS 1/22/2024 7:25 AM    
Entered room to completed vitals which patient refused. Patient continuously walking to roommates side of room and will not remain in bed regardless of redirection. Very agitated, anxious, impulsive, and angry. This RN entered the room to have patient return back to his side of the room patient screamed in this RN's face and stated \"LEAVE ME THE FU*K ALONE IM NOT IN A [facility] alf AM I?\" This RN informed the patient he was not bt he could not remain on his roommate's side of the room while he is trying to sleep. Patient verbalizing swearing at this RN and told me to \"GET THE FU*K OUT HIS ROOM NOW\" since he was in bed again  
[facility]  Internal Medicine Residency Program  Progress Note - House Team       Patient:  Ori Moralez 58 y.o. male   MRN: [id number]       Date of Service: 2024  Admission date: 2024  8:01 PM ; Hospital day: 0   CC: Numbness (Patient c/o facial numbness and R arm pain onset 7pm. Patient states he took 6 Neurontin pills today. ) and Arm Pain     Overnight events: no acute events     Subjective     Patient was seen and examined at bedside this morning. He is in no acute distress. He is alert, oriented x3, appeared mild agitated, pacing on hallway. He denied headache or dizziness, no CP or SOB. He complaints right arm and back pain.       Objective   PHYSICAL EXAM  General Appearance: not in acute distress  HEENT: Normocephalic, atraumatic  Neck: midline trachea, no carotid bruit  Lung: clear to auscultation bilaterally  Heart: regular rate and rhythm, no murmur  Abdomen: soft, bowel sounds normal; no masses  Extremities: no cyanosis or edema, tremor bilaterally hands and feet.  Musculokeletal: No joint swelling  Neurologic: Mental status: alert, oriented x3, thoughts appropriate , fast speech    CARDIOVASCULAR  Vitals: /72   Pulse 99   Temp 98.1 °F (36.7 °C) (Temporal)   Resp 18   Ht 1.803 m (5' 11\")   Wt 65.6 kg (144 lb 10 oz)   SpO2 94%   BMI 20.17 kg/m²     Pulse rate range      : Pulse  Av.3  Min: 90  Max: 110  BP range                  : Systolic (24hrs), Av , Min:115 , Max:121   ; Diastolic (24hrs), Av, Min:69, Max:82       PULMONARY  Respiration range   : Resp  Av  Min: 18  Max: 18  Pulse Ox range : SpO2  Av %  Min: 92 %  Max: 99 %  No results for input(s): \"PH\", \"PCO2\", \"PO2\", \"HCO3\", \"BE\", \"O2SAT\" in the last 72 hours.    Invalid input(s): \"PFRATIO\"     NEPHROLOGY (FLUIDS/ELECTROLYTES & NUTRITION)  Urine: 1200 mL   I & O - 24hr:    Intake/Output Summary (Last 24 hours) at 2024 0906  Last data filed at 2024 1810  Gross per 24 
[facility]  Internal Medicine Residency / House Medicine Service--addendum to resident note  Attending Physician Statement:  Nawaf Heard M.D., F.A.C.P.  Hospital charts reviewed, including other providers notes, relevant labs and imaging. Coordinating care with residents, other providers and/or counseling patient    I have seen patient/discussed the history and PE with the resident, and I agree with workup/plan and orders as documented by the resident.  (billing based on complexity of Medical decision making)  My Assessment as follows:        Rule out -- \"New buttock/abscess\" vs necrosis-- Tenderness to buttock-- but NO fullness or cellulitis/abscess on exam  Inc crp, esr, procalcitonin-- treated for \"pneumonia\"   90% N on admission  Suspcious for possible infection  Rule out pneumonia  +patchy lesions opacities lung left lower lobe  [facility]  No cough, no hypoxia  Respiratory panel neg  Oral kieran on exam.  Blood cx pending      ER started levaquin- continuign this   No drainage (hx of pilodal cyst)- R gluteal cleft scars  CK 1.802-- repeat CK persistent elevated CK worse-- rule out metabolic vs other insult to muscles   Plan Hydration - refusing IV R arm?    +LA persisting   Heartburn complaints- trop borderline - doubt cardiac     CK 2.495-- persistent NOT trended.down  Ast elevation, hemoglobin urine +  Ast elevated also 9/2023  Persistently elevated  Elevated CK  3,700  back  2016  +188 CK 2016  -- but urine no blood 9/23      Hx of methadone  Not in OARS  We called faciliity and he has bottle-- will resume  confirmed 60mg daily   Urine drug screen - didn't show anything other than methadone  Rule out subQ IM injection/heroin/xylazine necrosis-- but no abscess seen.     
[facility]  Internal Medicine Residency Program  Progress Note - House Team       Patient:  Ori Moralez 58 y.o. male   MRN: [id number]       Date of Service: 1/22/2024    CC: R arm and buttock pain  Overnight events: CK 2517> 2554>2495 and LA 2.6    Subjective     Patient seen and examined at bedside this morning. Appears in mild distress recounting events of early this morning when he woke up and sleepwalked into the hallway. He states the nurses asked him to return to his room at which point he sat on the chair near his roommate's bed and the nurse then asked him to get up and return to his bed. He states the nurses were ganging up on him and that he was just minding his own business.     Patient otherwise states he feels well, that his arm and buttock pain is slightly improved  and rates it a 4/10 on the pain scale. He denies any symptoms of cough, SOB, nausea, vomiting. He states his appetite is good and he would like some coffee this morning.         Objective       Physical Exam  Vitals: /72   Pulse 99   Temp 98.1 °F (36.7 °C) (Temporal)   Resp 18   Ht 1.803 m (5' 11\")   Wt 65.6 kg (144 lb 10 oz)   SpO2 94%   BMI 20.17 kg/m²     I & O - 24hr: No intake/output data recorded.   General Appearance: alert, appears stated age, cooperative, distracted, and mild distress  HEENT:  Head: Normocephalic, no lesions, without obvious abnormality.  Neck: no JVD  Lung: clear to auscultation bilaterally  Heart: regular rate and rhythm, S1, S2 normal, no murmur, click, rub or gallop  Abdomen: soft, non-tender; bowel sounds normal; no masses,  no organomegaly  Extremities:  extremities normal, atraumatic, no cyanosis or edema  Musculokeletal: No joint swelling, mild R antecubital tenderness to palpation. ROM normal in all joints of extremities.   Neurologic: Mental status: Alert, oriented, thought content tangential, affect: normal, increased in intensity, and redirectable    Diet: 
Refusing to wear heart monitor  
[facility]  Internal Medicine Residency Program  Progress Note - House Team       Patient:  Ori Moralez 58 y.o. male   MRN: [id number]       Date of Service: 1/23/2024    CC: Gluteal pain    Subjective     Overnight events: No acute events noted      Patient seen and examined at bedside this morning, alert and oriented, not in cardiorespiratory distress. Still with R arm pain and gluteal pain, but improved (6/10, compared to 10/10 on admission). Noted constipation, which is chronic since he is on methadone. Appetite is good. Denies abdominal pain, chest pain, SOB.     Objective       Physical Exam  Vitals: /87   Pulse 88   Temp 97.8 °F (36.6 °C) (Temporal)   Resp 19   Ht 1.803 m (5' 11\")   Wt 65.6 kg (144 lb 10 oz)   SpO2 98%   BMI 20.17 kg/m²     I & O - 24hr: No intake/output data recorded.   General Appearance: alert, appears stated age, and cooperative  HEENT:  Head: Normocephalic, no lesions, without obvious abnormality.  Neck: no adenopathy, no carotid bruit, no JVD, and supple, symmetrical, trachea midline  Lung: clear to auscultation bilaterally  Heart: regular rate and rhythm, S1, S2 normal, no murmur, click, rub or gallop  Abdomen: soft, non-tender; bowel sounds normal; no masses,  no organomegaly  Extremities:  extremities normal, atraumatic, no cyanosis or edema  Musculokeletal: (+) minimal tenderness, R antecubital area, no erythema or swelling noted. (+) direct tenderness over R gluteal area superior to PSIS, no erythema or swelling  Neurologic: Mental status: Alert, oriented, thought content appropriate    Diet:   ADULT DIET; Regular    Pertinent Labs & Imaging Studies     Labs  Recent Labs     01/20/24  2113 01/21/24  0437 01/22/24  0429   WBC 8.8 9.5 8.0   RBC 4.52 3.84 3.69*   HGB 13.9 12.0* 11.3*   HCT 44.3 37.3 36.5*   MCV 98.0 97.1 98.9   MCH 30.8 31.3 30.6   MCHC 31.4* 32.2 31.0*   RDW 13.8 13.9 13.8    191 152   MPV 10.5 11.3 11.4     Recent Labs 
[facility]  Internal Medicine Residency Program  Progress Note - House Team       Patient:  Ori Moralez 58 y.o. male   MRN: [id number]       Date of Service: 1/21/2024    CC: Right buttock and arm pain  Overnight events:   none     Subjective     This morning the patient was seen at bedside in no acute distress. Patient said he has been taking methadone from [facility], was on 50 mgs, was supposed to start on 60 mgs from today, gets at 6:30 am every morning for last 2 weeks. Last dose was yesterday at 6:30 am. Patient has some nasal discharge but no fever, cough, sputum.       Objective       Physical Exam  Vitals: /84   Pulse 88   Temp 98.1 °F (36.7 °C) (Oral)   Resp 18   Ht 1.803 m (5' 11\")   Wt 65.6 kg (144 lb 10 oz)   SpO2 98%   BMI 20.17 kg/m²     I & O - 24hr: I/O this shift:  In: -   Out: 600 [Urine:600]   General Appearance: alert, appears stated age, cooperative, and restlessness  HEENT:  Head: Normal, normocephalic, atraumatic.  Eye: Normal external eye, conjunctiva, lids cornea, ARIANA.  Lung: clear to auscultation bilaterally  Heart: regular rate and rhythm  Abdomen: soft, non-tender; bowel sounds normal; no masses,  no organomegaly  Extremities:  extremities normal, atraumatic, no cyanosis or edema and some needle marks in antecubital area from blood draw according to patient. Right buttock: area of tenderness to palpation, no skin changes, left buttock has a scar liana  from pilonidal sinus surgery , bilateral pulses in UE, LE intact  Musculokeletal: +muscle tenderness in right antecubital area. ROM normal in all joints of extremities.   Neurologic: Mental status: alertness: alert, orientation: time, person, place, Motor : 5/5 b/l UE,  LE, Sensory : intact b/l, cranial nerves grossly intact, restlessness+    Diet:   ADULT DIET; Regular      Pertinent Labs & Imaging Studies     Labs    Recent Labs     01/20/24 2113 01/21/24  0437   WBC 8.8 9.5 
136 143   K 5.2* 4.2 3.9   CL 95* 98 105   MG 2.1  --  1.9   PHOS  --  2.6 2.9   CO2 29 25 24   BUN 16 16 10   CREATININE 1.0 0.8 0.8   ANIONGAP 11 13 14   GLUCOSE 95 116* 67*   CALCIUM 9.6 8.7 8.6   PROT 7.6  --   --    LABALBU 4.4  --   --    BILITOT 0.3  --   --    ALKPHOS 78  --   --    AST 46*  --   --    ALT 20  --   --      Recent Labs     01/20/24  2113 01/21/24  0437 01/21/24  1110 01/22/24  0835   LACTA 2.1 3.2* 2.6* 2.4*     Recent Labs     01/20/24  2355 01/21/24  0437 01/21/24  0733   TROPHS 71* 50* 35*     CK trending down: 2554 > 2495 > 1897 > 1070 > 1009 (most recent value from 1/23 at 1156)      Imaging Studies:    CT HEAD WO CONTRAST   Final Result   No acute intracranial abnormality.         XR CHEST PORTABLE   Final Result   Patchy bilateral lung opacities.                  Resident's Assessment and Plan       Assessment and Plan:    Community acquired pneumonia, stable  CXR showed bilateral patchy opacities  Lactic acid trending down from 3.2 to 2.6   WBC 9.5 trending down to 8.0  Negative respiratory panel  Currently no respiratory symptoms  Respiratory culture showed S. Aureus, negative for MRSA, noted as mixed \"normal respiratory kieran\"   Blood culture showed no growth x 24 hrs  Plan  - Continue levofloxacin for 5 days      Rhabdomyolysis, improved  CK trending down; 1897 at 0909 yesterday to 1070 at 0636 today and then 1009 at 1156  Potassium 5.2 at 0636 today, 4.4 when rechecked at 1156  UA showed trace urine hemoglobin  No history of recent injury or illness  Plan  - follow up CK levels with primary care once discharged        R arm and buttock pain, improved  Patient initially presented for a shooting pain in R antecubital area and right  buttock that began suddenly while walking at home and states he had never experienced pain like that before  NIHSS score of 0  CT head showed no acute abnormality  Patient reports symptoms are improving  Plan  - plan for discharge        Hx of

## 2024-01-26 ENCOUNTER — TELEPHONE (OUTPATIENT)
Dept: FAMILY MEDICINE CLINIC | Age: 59
End: 2024-01-26

## 2024-01-26 LAB
MICROORGANISM SPEC CULT: NORMAL
SERVICE CMNT-IMP: NORMAL
SPECIMEN DESCRIPTION: NORMAL

## 2024-01-26 RX ORDER — POLYETHYLENE GLYCOL 3350 17 G/17G
17 POWDER, FOR SOLUTION ORAL DAILY
Qty: 1530 G | Refills: 0 | Status: SHIPPED | OUTPATIENT
Start: 2024-01-26 | End: 2024-04-25

## 2024-01-26 RX ORDER — SENNA AND DOCUSATE SODIUM 50; 8.6 MG/1; MG/1
1 TABLET, FILM COATED ORAL DAILY
Qty: 30 TABLET | Refills: 0 | Status: SHIPPED | OUTPATIENT
Start: 2024-01-26

## 2024-01-26 NOTE — TELEPHONE ENCOUNTER
Attempted to reach patient to inform him of medications sent; received the following message; \"The person you are trying to reach is not accepting calls at this time\"

## 2024-01-26 NOTE — TELEPHONE ENCOUNTER
Pt called this morning and states he takes Methadone 10mg and he is really really constipated and would like something called in- said he \"needs something strong, not just a stool softener, but maybe miralax, or something stronger\".    States it's been a little over a week since he has had a BM; also said he is not able to come into the office because he doesn't have transportation.    Preferred Pharmacy: [facility] (uses their delivery service)    Please advise

## 2024-01-29 DIAGNOSIS — G62.9 NEUROPATHY: ICD-10-CM

## 2024-01-29 NOTE — TELEPHONE ENCOUNTER
LVM for patient requesting return call to schedule routine follow up around 3/29/2024 as well as an ED follow up from 1/20/2024

## 2024-01-29 NOTE — TELEPHONE ENCOUNTER
----- Message from Vince Saldana sent at 1/29/2024  8:42 AM EST -----  Subject: Refill Request    QUESTIONS  Name of Medication? gabapentin (NEURONTIN) 800 MG tablet  Patient-reported dosage and instructions? 800mg 3 x day   How many days do you have left? 0  Preferred Pharmacy? [facility]  Pharmacy phone number (if available)? [phone]  ---------------------------------------------------------------------------  --------------  CALL BACK INFO  What is the best way for the office to contact you? OK to leave message on   voicemail  Preferred Call Back Phone Number? [phone]  ---------------------------------------------------------------------------  --------------  SCRIPT ANSWERS  Relationship to Patient? Self

## 2024-01-30 RX ORDER — GABAPENTIN 800 MG/1
800 TABLET ORAL 3 TIMES DAILY
Qty: 90 TABLET | Refills: 0 | Status: SHIPPED | OUTPATIENT
Start: 2024-01-30 | End: 2024-02-29

## 2024-02-09 ENCOUNTER — TELEPHONE (OUTPATIENT)
Dept: FAMILY MEDICINE CLINIC | Age: 59
End: 2024-02-09

## 2024-02-09 NOTE — TELEPHONE ENCOUNTER
----- Message from Ramya Campbell sent at 2/9/2024  8:13 AM EST -----  Subject: Refill Request    QUESTIONS  Name of Medication? mirtazapine (REMERON) 45 MG tablet  Patient-reported dosage and instructions? once at night  How many days do you have left? 0  Preferred Pharmacy? [facility]  Pharmacy phone number (if available)? [phone]  ---------------------------------------------------------------------------  --------------,  Name of Medication? OLANZapine (ZYPREXA) 20 MG tablet  Patient-reported dosage and instructions? once daily  How many days do you have left? 0  Preferred Pharmacy? [facility]  Pharmacy phone number (if available)? [phone]  ---------------------------------------------------------------------------  --------------,  Name of Medication? buPROPion 450 MG TB24  Patient-reported dosage and instructions? once daily  How many days do you have left? 2  Preferred Pharmacy? [facility]  Pharmacy phone number (if available)? [phone]  ---------------------------------------------------------------------------  --------------  CALL BACK INFO  What is the best way for the office to contact you? OK to leave message on   voicemail  Preferred Call Back Phone Number? [phone]  ---------------------------------------------------------------------------  --------------  SCRIPT ANSWERS  Relationship to Patient? Self

## 2024-02-09 NOTE — TELEPHONE ENCOUNTER
Last Appointment   12/29/2023  Next Appointment  2/12/2024       Patient had to reschedule todays appt due to transportation

## 2024-02-19 ENCOUNTER — OFFICE VISIT (OUTPATIENT)
Dept: FAMILY MEDICINE CLINIC | Age: 59
End: 2024-02-19
Payer: MEDICAID

## 2024-02-19 VITALS
DIASTOLIC BLOOD PRESSURE: 102 MMHG | BODY MASS INDEX: 21.22 KG/M2 | HEIGHT: 69 IN | OXYGEN SATURATION: 98 % | HEART RATE: 104 BPM | TEMPERATURE: 98.3 F | WEIGHT: 143.3 LBS | RESPIRATION RATE: 18 BRPM | SYSTOLIC BLOOD PRESSURE: 153 MMHG

## 2024-02-19 DIAGNOSIS — F25.0 SCHIZOAFFECTIVE DISORDER, BIPOLAR TYPE (HCC): ICD-10-CM

## 2024-02-19 DIAGNOSIS — Z87.891 PERSONAL HISTORY OF TOBACCO USE: ICD-10-CM

## 2024-02-19 DIAGNOSIS — F11.11 HISTORY OF OPIOID ABUSE (HCC): ICD-10-CM

## 2024-02-19 DIAGNOSIS — L98.9 SKIN LESION: ICD-10-CM

## 2024-02-19 DIAGNOSIS — Z12.11 COLON CANCER SCREENING: ICD-10-CM

## 2024-02-19 DIAGNOSIS — M62.82 NON-TRAUMATIC RHABDOMYOLYSIS: ICD-10-CM

## 2024-02-19 DIAGNOSIS — I10 HYPERTENSION, UNSPECIFIED TYPE: ICD-10-CM

## 2024-02-19 DIAGNOSIS — Z76.0 MEDICATION REFILL: ICD-10-CM

## 2024-02-19 DIAGNOSIS — Z71.6 TOBACCO ABUSE COUNSELING: ICD-10-CM

## 2024-02-19 DIAGNOSIS — G62.9 NEUROPATHY: ICD-10-CM

## 2024-02-19 DIAGNOSIS — Z09 HOSPITAL DISCHARGE FOLLOW-UP: Primary | ICD-10-CM

## 2024-02-19 DIAGNOSIS — Z78.9 ALCOHOL USE: ICD-10-CM

## 2024-02-19 PROCEDURE — G0296 VISIT TO DETERM LDCT ELIG: HCPCS

## 2024-02-19 PROCEDURE — 3077F SYST BP >= 140 MM HG: CPT

## 2024-02-19 PROCEDURE — 1111F DSCHRG MED/CURRENT MED MERGE: CPT

## 2024-02-19 PROCEDURE — 3080F DIAST BP >= 90 MM HG: CPT

## 2024-02-19 PROCEDURE — 99213 OFFICE O/P EST LOW 20 MIN: CPT

## 2024-02-19 RX ORDER — LIDOCAINE 50 MG/G
3 PATCH TOPICAL EVERY 24 HOURS
Qty: 90 PATCH | Refills: 0 | Status: SHIPPED | OUTPATIENT
Start: 2024-02-19

## 2024-02-19 RX ORDER — GABAPENTIN 800 MG/1
800 TABLET ORAL 3 TIMES DAILY
Qty: 90 TABLET | Refills: 0 | Status: SHIPPED | OUTPATIENT
Start: 2024-02-19 | End: 2024-03-20

## 2024-02-19 RX ORDER — IBUPROFEN 600 MG/1
600 TABLET ORAL 3 TIMES DAILY PRN
Qty: 30 TABLET | Refills: 0 | Status: SHIPPED
Start: 2024-02-19 | End: 2024-02-19

## 2024-02-19 RX ORDER — LISINOPRIL 10 MG/1
10 TABLET ORAL DAILY
Qty: 90 TABLET | Refills: 1 | Status: SHIPPED | OUTPATIENT
Start: 2024-02-19

## 2024-02-19 RX ORDER — IBUPROFEN 600 MG/1
600 TABLET ORAL 3 TIMES DAILY PRN
Qty: 15 TABLET | Refills: 0 | Status: SHIPPED | OUTPATIENT
Start: 2024-02-19

## 2024-02-19 RX ORDER — OLANZAPINE 20 MG/1
30 TABLET ORAL DAILY
Qty: 45 TABLET | Refills: 0 | Status: SHIPPED | OUTPATIENT
Start: 2024-02-19 | End: 2024-03-20

## 2024-02-19 RX ORDER — BUPROPION HYDROCHLORIDE 450 MG/1
450 TABLET, FILM COATED, EXTENDED RELEASE ORAL DAILY
Qty: 30 TABLET | Refills: 0 | Status: SHIPPED | OUTPATIENT
Start: 2024-02-19 | End: 2024-03-20

## 2024-02-19 RX ORDER — MIRTAZAPINE 45 MG/1
45 TABLET, FILM COATED ORAL NIGHTLY
Qty: 30 TABLET | Refills: 0 | Status: CANCELLED | OUTPATIENT
Start: 2024-02-19 | End: 2024-03-20

## 2024-02-19 ASSESSMENT — PATIENT HEALTH QUESTIONNAIRE - PHQ9
3. TROUBLE FALLING OR STAYING ASLEEP: 0
8. MOVING OR SPEAKING SO SLOWLY THAT OTHER PEOPLE COULD HAVE NOTICED. OR THE OPPOSITE, BEING SO FIGETY OR RESTLESS THAT YOU HAVE BEEN MOVING AROUND A LOT MORE THAN USUAL: 2
SUM OF ALL RESPONSES TO PHQ9 QUESTIONS 1 & 2: 5
SUM OF ALL RESPONSES TO PHQ QUESTIONS 1-9: 13
2. FEELING DOWN, DEPRESSED OR HOPELESS: 2
10. IF YOU CHECKED OFF ANY PROBLEMS, HOW DIFFICULT HAVE THESE PROBLEMS MADE IT FOR YOU TO DO YOUR WORK, TAKE CARE OF THINGS AT HOME, OR GET ALONG WITH OTHER PEOPLE: 1
9. THOUGHTS THAT YOU WOULD BE BETTER OFF DEAD, OR OF HURTING YOURSELF: 0
SUM OF ALL RESPONSES TO PHQ QUESTIONS 1-9: 13
1. LITTLE INTEREST OR PLEASURE IN DOING THINGS: 3
4. FEELING TIRED OR HAVING LITTLE ENERGY: 2
7. TROUBLE CONCENTRATING ON THINGS, SUCH AS READING THE NEWSPAPER OR WATCHING TELEVISION: 2
6. FEELING BAD ABOUT YOURSELF - OR THAT YOU ARE A FAILURE OR HAVE LET YOURSELF OR YOUR FAMILY DOWN: 1
5. POOR APPETITE OR OVEREATING: 1

## 2024-02-19 NOTE — PROGRESS NOTES
Post-Discharge Transitional Care Follow Up      Ori Moralez   YOB: 1965    Date of Office Visit:  2/19/2024  Date of Hospital Admission: 1/20/24  Date of Hospital Discharge: 1/23/24  Readmission Risk Score (high >=14%. Medium >=10%):Readmission Risk Score: 7.9    Care management risk score Rising risk (score 2-5) and Complex Care (Scores >=6): No Risk Score On File     Non face to face  following discharge, date last encounter closed (first attempt may have been earlier): *No documented post hospital discharge outreach found in the last 14 days     Call initiated 2 business days of discharge: *No response recorded in the last 14 days     Hospital discharge follow-up,Non-traumatic rhabdomyolysis  -     [place] DISCHARGE MEDS RECONCILED W/ CURRENT OUTPATIENT MED LIST  - encouraged to complete labs to monitor CK levels  -increase water intake   - [facility] that [facility] cause [facility]habdo  -     DME Order for [facility] as OP  Hypertension, unspecified type  Uncontrolled  Start with lisinpirl 10mg tirtate up as needed   Patient to log BP and come in 2 weeks for a BP check, and lab work   DASH diet, lifestyle modifications discussed   -     lisinopril (PRINIVIL;ZESTRIL) 10 MG tablet; Take 1 tablet by mouth daily, Disp-90 tablet, R-1Normal  -     Basic Metabolic Panel; Future- monitor creatinine after starting medication   Medication refill  Schizoaffective disorder, bipolar type (HCC)  Remeron not refilled - concerns for rhabdomyolysis   Patient encouraged to follow up with psych, needs to consider changing medications, monitoring for causes of rhabdomyolysis.   -     buPROPion HCl ER, XL, 450 MG TB24; Take 450 mg by mouth daily, Disp-30 tablet, R-0Normal  -     OLANZapine (ZYPREXA) 20 MG tablet; Take 1.5 tablets by mouth daily, Disp-45 tablet, R-0Normal  Neuropathy  Ibupfron decrease dose and number of pills provided   No flexiril  -     gabapentin (NEURONTIN) 800 MG tablet; Take 1

## 2024-02-19 NOTE — PROGRESS NOTES
[facility]  [facility]    Subjective:  Hospital f/u  Rhabdomyolysis and CAP    Neuropathy: on b/l legs  Hx of herniated disc disease    Schizoaffective disorder- Bipolar typ    ROS otherwise negative     Past medical, surgical, family and social history were reviewed, non-contributory, and unchanged unless otherwise stated.    Objective:    BP (!) 153/102   Pulse (!) 104   Temp 98.3 °F (36.8 °C) (Temporal)   Resp 18   Ht 1.753 m (5' 9\")   Wt 65 kg (143 lb 4.8 oz)   SpO2 98%   BMI 21.16 kg/m²     Exam is as noted by resident with the following changes, additions or corrections:    General:  NAD; alert & oriented x 3   Heart:  RRR, no murmurs, gallops, or rubs.  Lungs:  CTA bilaterally, no wheeze, rales or rhonchi  Abd: bowel sounds present, nontender, nondistended, no masses  Extrem:  No clubbing, cyanosis, or edema    Assessment/Plan:  Neuropathy  Mood disorder: follow with psych. Refill meds until gets seen by psych  Rhabdomyolysis: stop Remeron.  Skin lesion: schedule for procedure clinic  HTN: start Lisinopril. Check BMP  HM: update  F/u as instructed     Attending Physician Statement  I have reviewed the chart, including any radiology or labs. I have discussed the case, including pertinent history and exam findings with the resident.  I agree with the assessment, plan and orders as documented by the resident.  Please refer to the resident note for additional information.      Electronically signed by DANIELA GUZMAN MD on 2/19/2024 at 2:03 PM

## 2024-02-20 ENCOUNTER — TELEPHONE (OUTPATIENT)
Dept: FAMILY MEDICINE CLINIC | Age: 59
End: 2024-02-20

## 2024-02-20 PROBLEM — I10 HYPERTENSION: Status: ACTIVE | Noted: 2024-02-20

## 2024-02-20 PROBLEM — L98.9 SKIN LESION: Status: ACTIVE | Noted: 2024-02-20

## 2024-02-20 PROBLEM — F25.0 SCHIZOAFFECTIVE DISORDER, BIPOLAR TYPE (HCC): Status: ACTIVE | Noted: 2024-02-20

## 2024-02-20 PROBLEM — G62.9 NEUROPATHY: Status: ACTIVE | Noted: 2024-02-20

## 2024-02-20 PROBLEM — F11.11 HISTORY OF OPIOID ABUSE (HCC): Status: ACTIVE | Noted: 2024-02-20

## 2024-02-20 PROBLEM — Z87.891 PERSONAL HISTORY OF TOBACCO USE: Status: ACTIVE | Noted: 2024-02-20

## 2024-02-20 RX ORDER — CYCLOBENZAPRINE HCL 10 MG
10 TABLET ORAL 3 TIMES DAILY PRN
Qty: 90 TABLET | Refills: 0 | OUTPATIENT
Start: 2024-02-20

## 2024-02-20 ASSESSMENT — ENCOUNTER SYMPTOMS
SORE THROAT: 0
SHORTNESS OF BREATH: 0
ABDOMINAL DISTENTION: 0
DIARRHEA: 0
RHINORRHEA: 0
SINUS PRESSURE: 0
EYE DISCHARGE: 0
SINUS PAIN: 0
CONSTIPATION: 0
NAUSEA: 0
VOMITING: 0
EYE PAIN: 0
BACK PAIN: 0
ABDOMINAL PAIN: 0
CHEST TIGHTNESS: 0

## 2024-02-20 NOTE — TELEPHONE ENCOUNTER
Patient is requesting a letter to provide to psychiatry explaining why the Remeron was stopped. He states he spoke with them today and they need this information in order to change or increase his medications. He will be going to [facility] on [address] in [place]

## 2024-02-23 ENCOUNTER — TELEPHONE (OUTPATIENT)
Dept: FAMILY MEDICINE CLINIC | Age: 59
End: 2024-02-23

## 2024-02-23 NOTE — TELEPHONE ENCOUNTER
Patient did speed about 2 days ago and since then has noticed \"slices\" across his finger tips on both hands. They are very painful and bothersome. He states he has done speed in the past and has never had this problem. He thinks that it was laced with something else. Patient has not tried anything at home. He asks if there is anything he can get or try at home to help with this.

## 2024-02-29 ENCOUNTER — HOSPITAL ENCOUNTER (OUTPATIENT)
Age: 59
Discharge: HOME | End: 2024-02-29
Payer: MEDICAID

## 2024-02-29 ENCOUNTER — HOSPITAL ENCOUNTER (OUTPATIENT)
Dept: CT IMAGING | Age: 59
Discharge: HOME | End: 2024-03-02
Payer: MEDICAID

## 2024-02-29 DIAGNOSIS — Z87.891 PERSONAL HISTORY OF TOBACCO USE: ICD-10-CM

## 2024-02-29 DIAGNOSIS — I10 HYPERTENSION, UNSPECIFIED TYPE: ICD-10-CM

## 2024-02-29 LAB
ANION GAP SERPL CALCULATED.3IONS-SCNC: 17 MMOL/L (ref 7–16)
BUN SERPL-MCNC: 17 MG/DL (ref 6–20)
CALCIUM SERPL-MCNC: 9.3 MG/DL (ref 8.6–10.2)
CHLORIDE SERPL-SCNC: 102 MMOL/L (ref 98–107)
CO2 SERPL-SCNC: 24 MMOL/L (ref 22–29)
CREAT SERPL-MCNC: 1 MG/DL (ref 0.7–1.2)
GFR SERPL CREATININE-BSD FRML MDRD: >60 ML/MIN/1.73M2
GLUCOSE SERPL-MCNC: 82 MG/DL (ref 74–99)
POTASSIUM SERPL-SCNC: 4.5 MMOL/L (ref 3.5–5)
SODIUM SERPL-SCNC: 143 MMOL/L (ref 132–146)

## 2024-02-29 PROCEDURE — 36415 COLL VENOUS BLD VENIPUNCTURE: CPT

## 2024-02-29 PROCEDURE — 71271 CT THORAX LUNG CANCER SCR C-: CPT

## 2024-02-29 PROCEDURE — 80048 BASIC METABOLIC PNL TOTAL CA: CPT

## 2024-03-01 ENCOUNTER — TELEPHONE (OUTPATIENT)
Dept: CASE MANAGEMENT | Age: 59
End: 2024-03-01

## 2024-03-01 NOTE — TELEPHONE ENCOUNTER
No call, encounter opened to process CT Lung Screening.    CT Lung Screen: 2/29/2024    IMPRESSION:  1. There is no pulmonary infiltrate or pulmonary mass  2. Emphysematous changes  3. 4 mm pleural based nodule seen within the superior segment of the left  lower lobe  4. 2 mm subpleural nodule seen within the right upper lobe     LUNG RADS:  Lung-RADS 2 - Benign (v2022)     Management:  12 month screening LDCT     RECOMMENDATIONS:  If you would like to register your patient with the [facility] Lung Nodule/Lung  Cancer Screening Program, please contact the Nurse Navigator at  [phone].    Pack years: 24.1    Social History     Tobacco Use  Smoking Status: Current Every Day Smoker    Start Date: 1975   Quit Date:    Types: Cigarettes   Packs/Day: 0.5   Years: 448.2   Pack Years: 24.1   Smokeless Tobacco: Never         Results letter sent to patient via my chart or mailed.     Jose Coffey  Imaging Navigator   [facility]   [phone]

## 2024-03-04 DIAGNOSIS — J43.9 PULMONARY EMPHYSEMA, UNSPECIFIED EMPHYSEMA TYPE (HCC): Primary | ICD-10-CM

## 2024-03-18 RX ORDER — ALBUTEROL SULFATE 90 UG/1
2 AEROSOL, METERED RESPIRATORY (INHALATION) 2 TIMES DAILY PRN
Qty: 18 G | Refills: 0 | Status: SHIPPED | OUTPATIENT
Start: 2024-03-18

## 2024-03-22 ENCOUNTER — HOSPITAL ENCOUNTER (OUTPATIENT)
Dept: PULMONOLOGY | Age: 59
Discharge: HOME | End: 2024-03-22
Payer: MEDICAID

## 2024-03-22 DIAGNOSIS — J43.9 PULMONARY EMPHYSEMA, UNSPECIFIED EMPHYSEMA TYPE (HCC): ICD-10-CM

## 2024-03-22 PROCEDURE — 94010 BREATHING CAPACITY TEST: CPT

## 2024-04-02 DIAGNOSIS — J43.9 PULMONARY EMPHYSEMA, UNSPECIFIED EMPHYSEMA TYPE (HCC): ICD-10-CM

## 2024-04-02 DIAGNOSIS — G62.9 NEUROPATHY: ICD-10-CM

## 2024-04-02 DIAGNOSIS — I10 HYPERTENSION, UNSPECIFIED TYPE: ICD-10-CM

## 2024-04-02 RX ORDER — LISINOPRIL 10 MG/1
10 TABLET ORAL DAILY
Qty: 90 TABLET | Refills: 0 | Status: SHIPPED | OUTPATIENT
Start: 2024-04-02

## 2024-04-02 RX ORDER — LIDOCAINE 50 MG/G
3 PATCH TOPICAL EVERY 24 HOURS
Qty: 90 PATCH | Refills: 0 | Status: SHIPPED | OUTPATIENT
Start: 2024-04-02

## 2024-04-02 RX ORDER — GABAPENTIN 800 MG/1
800 TABLET ORAL 3 TIMES DAILY
Qty: 90 TABLET | Refills: 0 | Status: SHIPPED | OUTPATIENT
Start: 2024-04-02 | End: 2024-05-02

## 2024-04-08 ENCOUNTER — OFFICE VISIT (OUTPATIENT)
Dept: FAMILY MEDICINE CLINIC | Age: 59
End: 2024-04-08
Payer: MEDICAID

## 2024-04-08 VITALS
RESPIRATION RATE: 17 BRPM | DIASTOLIC BLOOD PRESSURE: 90 MMHG | TEMPERATURE: 97.9 F | HEIGHT: 69 IN | HEART RATE: 91 BPM | WEIGHT: 139 LBS | SYSTOLIC BLOOD PRESSURE: 135 MMHG | BODY MASS INDEX: 20.59 KG/M2 | OXYGEN SATURATION: 99 %

## 2024-04-08 DIAGNOSIS — M54.50 ACUTE LEFT-SIDED LOW BACK PAIN WITHOUT SCIATICA: ICD-10-CM

## 2024-04-08 DIAGNOSIS — F19.90 SUBSTANCE USE DISORDER: ICD-10-CM

## 2024-04-08 DIAGNOSIS — Z71.6 TOBACCO ABUSE COUNSELING: ICD-10-CM

## 2024-04-08 DIAGNOSIS — F25.0 SCHIZOAFFECTIVE DISORDER, BIPOLAR TYPE (HCC): ICD-10-CM

## 2024-04-08 DIAGNOSIS — R10.9 FLANK PAIN: Primary | ICD-10-CM

## 2024-04-08 DIAGNOSIS — G57.61 NEUROPATHY OF RIGHT LATERAL PLANTAR NERVE: ICD-10-CM

## 2024-04-08 LAB
BILIRUBIN URINE: NEGATIVE
COLOR: YELLOW
GLUCOSE URINE: NEGATIVE MG/DL
KETONES, URINE: NEGATIVE MG/DL
LEUKOCYTE ESTERASE, URINE: NEGATIVE
NITRITE, URINE: NEGATIVE
PH UA: 7 (ref 5–9)
PROTEIN UA: NEGATIVE MG/DL
RBC UA: NORMAL /HPF
SPECIFIC GRAVITY UA: 1.01 (ref 1–1.03)
TURBIDITY: CLEAR
URINE HGB: NEGATIVE
UROBILINOGEN, URINE: 0.2 EU/DL (ref 0–1)
WBC UA: NORMAL /HPF

## 2024-04-08 PROCEDURE — 99213 OFFICE O/P EST LOW 20 MIN: CPT

## 2024-04-08 PROCEDURE — 3080F DIAST BP >= 90 MM HG: CPT

## 2024-04-08 PROCEDURE — 3075F SYST BP GE 130 - 139MM HG: CPT

## 2024-04-08 SDOH — ECONOMIC STABILITY: HOUSING INSECURITY
IN THE LAST 12 MONTHS, WAS THERE A TIME WHEN YOU DID NOT HAVE A STEADY PLACE TO SLEEP OR SLEPT IN A SHELTER (INCLUDING NOW)?: NO

## 2024-04-08 SDOH — ECONOMIC STABILITY: FOOD INSECURITY: WITHIN THE PAST 12 MONTHS, YOU WORRIED THAT YOUR FOOD WOULD RUN OUT BEFORE YOU GOT MONEY TO BUY MORE.: NEVER TRUE

## 2024-04-08 SDOH — ECONOMIC STABILITY: INCOME INSECURITY: HOW HARD IS IT FOR YOU TO PAY FOR THE VERY BASICS LIKE FOOD, HOUSING, MEDICAL CARE, AND HEATING?: SOMEWHAT HARD

## 2024-04-08 SDOH — ECONOMIC STABILITY: FOOD INSECURITY: WITHIN THE PAST 12 MONTHS, THE FOOD YOU BOUGHT JUST DIDN'T LAST AND YOU DIDN'T HAVE MONEY TO GET MORE.: NEVER TRUE

## 2024-04-08 ASSESSMENT — ENCOUNTER SYMPTOMS
CHEST TIGHTNESS: 0
SINUS PAIN: 0
EYE DISCHARGE: 0
VOMITING: 0
SINUS PRESSURE: 0
RHINORRHEA: 0
SHORTNESS OF BREATH: 0
BACK PAIN: 0
SORE THROAT: 0
EYE PAIN: 0
ABDOMINAL DISTENTION: 0
DIARRHEA: 0
ABDOMINAL PAIN: 0
CONSTIPATION: 0
NAUSEA: 0

## 2024-04-08 NOTE — PROGRESS NOTES
[facility]  Department of Family Medicine  Family Medicine Residency Program      Patient: Ori Moralez  1965 58 y.o. male     Date of Service: 4/8/2024      Chief complaint:   Chief Complaint   Patient presents with    Skin Problem     Noticed sores on his fingers; patient thinks it's because of the drugs     Lower Back Pain     low back pain left side     Toe Pain     Little toe right foot for over a month        HISTORY OF PRESENTING ILLNESS     Ori Moralez is a 58 y.o. male presented to the clinic with complaints of  LBP, toe pain      Pain: left back side   Intensity - not very painful , noticed it is there  Quality -dull   Onset - 1 month - no falls, no injuries, but he did help  a stove around the same time   Progression - about the same   Radiation - flank and left kidney , left leg pain , grion pain   Alleviating - gabapentin, methadone, ibuprofen and lidocaine - helps   Aggrevating - laying down makes it better   Associated symptoms - no red flag symptoms, no discharge, plan, burns with urination   History of a herniated disk     Did drugs 2 months ago   Some speed - he thinks it was laced with some horse tranquilizers - has taking 2 months for his fingers wound to heal  - better now   Noticed saws on his finger tips  Answered about rhabdo answered     Patient states he had a falling out with his psych   [facility] in [place]   Wanted a UDS and didn't want to do that   Has just a few pills zyprexa, wellbutrin   Psych did not replace his remaron which we stopped last visit with me to due to rhabdo    Right foot  Small toe  1 month   Pulled out toe nail because he thought it was growing wrong   Minimal pain , he can feel it   Got better new shoes         Past Medical History:      Diagnosis Date    Disorder of rotator cuff of both shoulders     Herniation of lumbar intervertebral disc     Neuropathy     Opioid use disorder     Schizoaffective

## 2024-04-08 NOTE — PROGRESS NOTES
S: 58 y.o. male with   Chief Complaint   Patient presents with    Skin Problem     Noticed sores on his fingers; patient thinks it's because of the drugs     Lower Back Pain     low back pain left side     Toe Pain     Little toe right foot for over a month      Low back pain  -dull aching pain, happened one month ago, not improving, no specific injury, hx of herniated disk in the area  -radiates to left side and down into groin area  -having little toe pain on right, took toe nail out which did not help   -no bowel or urinary incontinence     O: VS:  height is 1.753 m (5' 9\") and weight is 63 kg (139 lb). His temperature is 97.9 °F (36.6 °C). His blood pressure is 135/90 (abnormal) and his pulse is 91. His respiration is 17 and oxygen saturation is 99%.   BP Readings from Last 3 Encounters:   04/08/24 (!) 135/90   02/19/24 (!) 153/102   01/23/24 130/87     See resident note    Impression/Plan:   1) Low back pain - unclear etiology, will get ua and kub to rule out kidney stone, if ok will start steroid taper to treat for concerns for sciatic vs other   2) Substance abuse - no longer following with psych as failed urine drug screen, refer to different psych       Health Maintenance Due   Topic Date Due    Hepatitis B vaccine (1 of 3 - 3-dose series) Never done    COVID-19 Vaccine (1) Never done    Pneumococcal 0-64 years Vaccine (1 of 2 - PCV) Never done    HIV screen  Never done    Hepatitis C screen  Never done    DTaP/Tdap/Td vaccine (1 - Tdap) Never done    Lipids  Never done    Colorectal Cancer Screen  Never done    Shingles vaccine (1 of 2) Never done         Attending Physician Statement  I have discussed the case, including pertinent history and exam findings with the resident.  I agree with the documented assessment and plan.      Alex Gayle, DO

## 2024-04-20 ENCOUNTER — PATIENT MESSAGE (OUTPATIENT)
Dept: FAMILY MEDICINE CLINIC | Age: 59
End: 2024-04-20

## 2024-04-20 DIAGNOSIS — J43.9 PULMONARY EMPHYSEMA, UNSPECIFIED EMPHYSEMA TYPE (HCC): ICD-10-CM

## 2024-04-20 DIAGNOSIS — I10 HYPERTENSION, UNSPECIFIED TYPE: ICD-10-CM

## 2024-04-20 DIAGNOSIS — G62.9 NEUROPATHY: ICD-10-CM

## 2024-04-22 DIAGNOSIS — G62.9 NEUROPATHY: ICD-10-CM

## 2024-04-22 DIAGNOSIS — I10 HYPERTENSION, UNSPECIFIED TYPE: ICD-10-CM

## 2024-04-22 DIAGNOSIS — J43.9 PULMONARY EMPHYSEMA, UNSPECIFIED EMPHYSEMA TYPE (HCC): ICD-10-CM

## 2024-04-22 RX ORDER — IBUPROFEN 600 MG/1
600 TABLET ORAL 3 TIMES DAILY PRN
Qty: 90 TABLET | Refills: 2 | Status: SHIPPED | OUTPATIENT
Start: 2024-04-22

## 2024-04-22 RX ORDER — LISINOPRIL 10 MG/1
10 TABLET ORAL DAILY
Qty: 30 TABLET | Refills: 2 | Status: SHIPPED | OUTPATIENT
Start: 2024-04-22

## 2024-04-22 RX ORDER — ALBUTEROL SULFATE 90 UG/1
2 AEROSOL, METERED RESPIRATORY (INHALATION) 2 TIMES DAILY PRN
Qty: 18 G | Refills: 2 | Status: SHIPPED | OUTPATIENT
Start: 2024-04-22

## 2024-04-22 NOTE — TELEPHONE ENCOUNTER
Last Appointment   4/8/2024  Next Appointment  Visit date not found    Return in about 3 months (around 7/8/2024) for Back Pain.

## 2024-04-22 NOTE — TELEPHONE ENCOUNTER
From: Ori Kimball  To: ISMAEL LOBO MD  Sent: 4/20/2024 1:45 PM EDT  Subject: Refills     I called [place] [facility] they said I have no current refills on any of my medications neurontins ibuprofen Lisinopril inhalers lidocaine patches ECT. Everything. Please Dr lobo can you please send in refills on all of my meds thanks Ori Kimball 1965

## 2024-04-23 RX ORDER — IBUPROFEN 600 MG/1
600 TABLET ORAL 3 TIMES DAILY PRN
Qty: 90 TABLET | Refills: 2 | OUTPATIENT
Start: 2024-04-23

## 2024-04-23 RX ORDER — GABAPENTIN 800 MG/1
800 TABLET ORAL 3 TIMES DAILY
Qty: 90 TABLET | Refills: 0 | OUTPATIENT
Start: 2024-04-23 | End: 2024-05-23

## 2024-04-23 RX ORDER — LISINOPRIL 10 MG/1
10 TABLET ORAL DAILY
Qty: 30 TABLET | Refills: 2 | OUTPATIENT
Start: 2024-04-23

## 2024-04-23 RX ORDER — ALBUTEROL SULFATE 90 UG/1
2 AEROSOL, METERED RESPIRATORY (INHALATION) 2 TIMES DAILY PRN
Qty: 18 G | Refills: 2 | OUTPATIENT
Start: 2024-04-23

## 2024-04-23 RX ORDER — LIDOCAINE 50 MG/G
3 PATCH TOPICAL EVERY 24 HOURS
Qty: 90 PATCH | Refills: 0 | OUTPATIENT
Start: 2024-04-23

## 2024-04-24 RX ORDER — LIDOCAINE 50 MG/G
3 PATCH TOPICAL EVERY 24 HOURS
Qty: 90 PATCH | Refills: 0 | Status: SHIPPED | OUTPATIENT
Start: 2024-04-24

## 2024-04-24 RX ORDER — GABAPENTIN 800 MG/1
800 TABLET ORAL 3 TIMES DAILY
Qty: 90 TABLET | Refills: 0 | Status: SHIPPED | OUTPATIENT
Start: 2024-04-24 | End: 2024-05-24

## 2024-04-24 RX ORDER — POLYETHYLENE GLYCOL 3350 17 G/17G
17 POWDER, FOR SOLUTION ORAL DAILY
Qty: 1530 G | Refills: 0 | Status: SHIPPED | OUTPATIENT
Start: 2024-04-24 | End: 2024-07-23

## 2024-04-24 RX ORDER — FLUTICASONE PROPIONATE 50 MCG
1 SPRAY, SUSPENSION (ML) NASAL DAILY
Qty: 32 G | Refills: 1 | Status: SHIPPED | OUTPATIENT
Start: 2024-04-24

## 2024-04-24 RX ORDER — SENNA AND DOCUSATE SODIUM 50; 8.6 MG/1; MG/1
1 TABLET, FILM COATED ORAL DAILY
Qty: 30 TABLET | Refills: 0 | Status: SHIPPED | OUTPATIENT
Start: 2024-04-24

## 2024-04-24 RX ORDER — TIOTROPIUM BROMIDE INHALATION SPRAY 1.56 UG/1
2 SPRAY, METERED RESPIRATORY (INHALATION) DAILY
Qty: 4 G | Refills: 0 | Status: SHIPPED | OUTPATIENT
Start: 2024-04-24

## 2024-04-26 ENCOUNTER — TELEPHONE (OUTPATIENT)
Dept: FAMILY MEDICINE CLINIC | Age: 59
End: 2024-04-26

## 2024-04-26 RX ORDER — PREDNISONE 10 MG/1
TABLET ORAL
Qty: 15 TABLET | Refills: 0 | Status: SHIPPED | OUTPATIENT
Start: 2024-04-26 | End: 2024-05-02

## 2024-04-26 NOTE — TELEPHONE ENCOUNTER
Patient calling complaining of worsening toe pain. He states he changed his shoes recently but the pain has gotten worse in the R pinky toe (discussed at visit on 4/8/2024). Patient states he can hardly walk on it, describes it as a shooting, throbbing pain (10/10 pain). He states it is better about an hour after taking his shoes and socks of and resting. Patient requests something to help with the pain until he is seen 5/1/2024. A steroid taper was mentioned but not prescribed at previous visit.

## 2024-05-01 NOTE — PROGRESS NOTES
PSYCHIATRIC INITIAL EVALUATION      CHIEF COMPLAINT:  \"My old doctor was an asshole.\"    HISTORY OF PRESENT ILLNESS: Ori Moralez is a 58 y.o. male who presents for psychiatric evaluation at [facility]. Patient with a past medical history of hypertension, rhabdomyolysis, neuropathy, opioid abuse, and schizoaffective disorder. Home psychotropics include Zyprexa 30 mg daily, Wellbutrin 450 mg daily, and methadone 80 mg daily. Patient reports being prescribed psychotropic medications for the past 32 years. He reports being diagnosed with schizoaffective disorder at that time, but currently feels stable. Per patient, \"I have been on 50 different pills and these ones worked best.\" Patient reports occasional mood lability and irritability, but overall feels mood is stable. Patient denies experiencing symptoms of depression. He complains of racing thoughts. Patient reports a history of auditory and visual hallucinations, but denies currently experiencing these symptoms. Patient reports a history of command auditory hallucinations and visual hallucinations of people. He endorses some symptoms of paranoia. Patient reports an extensive past psychiatric history and has been hospitalized \"tons of times all over the country.\" Patient reports he is sensitive to psychotropic medications and has a history of EPS symptoms. Patient reports a history of abusing prescription pain medications, was previously on Suboxone and now treats at [facility] for methadone therapy. Patient reports current psychotropic medication regimen works well for him. He was previously prescribed Remeron for sleep and appetite stimulation, but reports that he developed rhabdomyolysis and his PCP advised him to stop taking the medication. Patient complaining of diminished appetite and impaired sleep, but but reports \"it has always been that way.\" Patient currently receives a check from [employer], but reports struggling financially. He

## 2024-05-02 ENCOUNTER — OFFICE VISIT (OUTPATIENT)
Age: 59
End: 2024-05-02
Payer: MEDICAID

## 2024-05-02 DIAGNOSIS — F25.0 SCHIZOAFFECTIVE DISORDER, BIPOLAR TYPE (HCC): ICD-10-CM

## 2024-05-02 PROCEDURE — 90792 PSYCH DIAG EVAL W/MED SRVCS: CPT

## 2024-05-02 RX ORDER — BUPROPION HYDROCHLORIDE 450 MG/1
450 TABLET, FILM COATED, EXTENDED RELEASE ORAL DAILY
Qty: 30 TABLET | Refills: 0 | Status: SHIPPED | OUTPATIENT
Start: 2024-05-02 | End: 2024-06-01

## 2024-05-02 RX ORDER — OLANZAPINE 20 MG/1
30 TABLET ORAL DAILY
Qty: 45 TABLET | Refills: 0 | Status: SHIPPED | OUTPATIENT
Start: 2024-05-02 | End: 2024-06-01

## 2024-11-17 ENCOUNTER — EMERGENCY (EMERGENCY)
Facility: HOSPITAL | Age: 59
LOS: 1 days | Discharge: HOME | End: 2024-11-17
Admitting: EMERGENCY MEDICINE
Payer: SELF-PAY

## 2024-11-17 VITALS
DIASTOLIC BLOOD PRESSURE: 95 MMHG | TEMPERATURE: 98 F | RESPIRATION RATE: 18 BRPM | HEART RATE: 79 BPM | HEIGHT: 68 IN | OXYGEN SATURATION: 98 % | SYSTOLIC BLOOD PRESSURE: 161 MMHG | WEIGHT: 174.17 LBS

## 2024-11-17 PROCEDURE — 99284 EMERGENCY DEPT VISIT MOD MDM: CPT

## 2024-11-17 RX ORDER — KETOROLAC TROMETHAMINE 30 MG/ML
30 INJECTION INTRAMUSCULAR; INTRAVENOUS ONCE
Refills: 0 | Status: DISCONTINUED | OUTPATIENT
Start: 2024-11-17 | End: 2024-11-17

## 2024-11-17 RX ADMIN — KETOROLAC TROMETHAMINE 30 MILLIGRAM(S): 30 INJECTION INTRAMUSCULAR; INTRAVENOUS at 17:42

## 2024-11-17 NOTE — ED ADULT NURSE NOTE - CAS EDN INTEG ASSESS
Face to Face Note  -  Durable Medical Equipment    Gallito Szymanski D.O. - SABINEI: [phone]  I certify that this patient is under my care and that they had a durable medical equipment(DME)face to face encounter by myself that meets the physician DME face-to-face encounter requirements with this patient on:    Date of encounter:   Patient:                    MRN:                       YOB: 2016  Zander Domínguez  [id number]  1953     The encounter with the patient was in whole, or in part, for the following medical condition, which is the primary reason for durable medical equipment:  Asthma    I certify that, based on my findings, the following durable medical equipment is medically necessary:  Oxygen.    HOME O2 Saturation Measurements:(Values must be present for Home Oxygen orders)  Room air sat at rest: 94  Room air sat with amb: 82  With liters of O2: 0, O2 sat at rest with O2: 94  With Liters of O2: 3, O2 sat with amb with O2 : 90  Is the patient mobile?: Yes    My Clinical findings support the need for the above equipment due to:  Hypoxia    Supporting Symptoms: room air oxygen saturation during ambulation is below 88%     - - -

## 2024-11-17 NOTE — ED PROVIDER NOTE - PHYSICAL EXAMINATION
CONSTITUTIONAL: Well-appearing; well-nourished; in no apparent distress.   	HEAD: Normocephalic; atraumatic.   	EYES:  conjunctiva and sclera clear  	ENT: wires in mouth. no facial swelling. no crepitus.   	SKIN: Normal for age and race; warm; dry; good turgor; no apparent lesions or rash.   	NEURO: A & O x 3; face symmetric; grossly unremarkable.   PSYCHOLOGICAL: The patient’s mood and manner are appropriate.

## 2024-11-17 NOTE — ED PROVIDER NOTE - NSFOLLOWUPINSTRUCTIONS_ED_ALL_ED_FT
Please follow up with   [facility]  [address] [place] [id number]  To make an appointment, call [phone]    [facility]  Address: [facility] [place] [address]  Appointment Center: [phone] ([phone])     To access your record on the patient portal [facility], please visit:  [email or web address]  If you are having difficulties setting this up, call [phone] and someone can assist you over the phone.     PLEASE RETURN TO THE ER IMMEDIATELY OR CALL 261 ANY HIGH FEVER, CHEST PAIN, TROUBLE BREATHING, VOMITING, SEVERE PAIN, OR ANY OTHER CONCERNS
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
## 2024-11-17 NOTE — ED PROVIDER NOTE - PATIENT PORTAL LINK FT
You can access the FollowMyHealth Patient Portal offered by [facility] by registering at the following website: [email or web address] By joining [email or web address]’s FollowMyHealth portal, you will also be able to view your health information using other applications (apps) compatible with our system.

## 2024-11-17 NOTE — ED PROVIDER NOTE - OBJECTIVE STATEMENT
57 yo male undomiciled here requesting pain meds, he states he had jaw surgery with wires placed about 2 weeks ago in [place], he has pain medications waiting at pharmacy but he is unable to pay for it as he has no money. denies fever/chills. states he has been going from hospital to hospital looking for pain medications as he does not have money to return to [place] to follow up with oral surgeon then.

## 2024-11-17 NOTE — ED ADULT TRIAGE NOTE - CHIEF COMPLAINT QUOTE
patient walk in c/o "wires from my broken jaw are poking inside my mouth; I need pain meds"; says he has been seen [place] over [place] but can't afford the prescriptions; jaw wired in [facility] over 2 weeks ago
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
## 2024-11-17 NOTE — ED ADULT NURSE NOTE - RESPIRATION RHYTHM, QM
Not prescribed by , advised pt may need to call prescribers office since it went to our office via [employer].  
regular

## 2024-11-17 NOTE — ED PROVIDER NOTE - CLINICAL SUMMARY MEDICAL DECISION MAKING FREE TEXT BOX
59 yo male undomiciled here requesting pain meds, he states he had jaw surgery with wires placed about 2 weeks ago in [place], he has pain medications waiting at pharmacy but he is unable to pay for it as he has no money. denies fever/chills. states he has been going from hospital to hospital looking for pain medications as he does not have money to return to [place] to follow up with oral surgeon then.    afebrile, will order dose of pain meds, provide soft food and refer to [facility] to followup.

## 2024-11-18 PROBLEM — F25.9 SCHIZOAFFECTIVE DISORDER, UNSPECIFIED: Chronic | Status: ACTIVE | Noted: 2020-03-26

## 2024-11-18 PROBLEM — M54.9 DORSALGIA, UNSPECIFIED: Chronic | Status: ACTIVE | Noted: 2020-03-26

## 2024-11-18 PROBLEM — B19.20 UNSPECIFIED VIRAL HEPATITIS C WITHOUT HEPATIC COMA: Chronic | Status: ACTIVE | Noted: 2020-03-26

## 2024-11-20 DIAGNOSIS — Z88.8 ALLERGY STATUS TO OTHER DRUGS, MEDICAMENTS AND BIOLOGICAL SUBSTANCES: ICD-10-CM

## 2024-11-20 DIAGNOSIS — R68.84 JAW PAIN: ICD-10-CM

## 2024-11-20 DIAGNOSIS — Z59.00 HOMELESSNESS UNSPECIFIED: ICD-10-CM

## 2024-11-20 SDOH — ECONOMIC STABILITY - HOUSING INSECURITY: HOMELESSNESS UNSPECIFIED: Z59.00
